# Patient Record
Sex: FEMALE | Race: WHITE | NOT HISPANIC OR LATINO | Employment: FULL TIME | URBAN - METROPOLITAN AREA
[De-identification: names, ages, dates, MRNs, and addresses within clinical notes are randomized per-mention and may not be internally consistent; named-entity substitution may affect disease eponyms.]

---

## 2017-02-13 ENCOUNTER — ALLSCRIPTS OFFICE VISIT (OUTPATIENT)
Dept: OTHER | Facility: OTHER | Age: 52
End: 2017-02-13

## 2017-02-13 DIAGNOSIS — E78.00 PURE HYPERCHOLESTEROLEMIA: ICD-10-CM

## 2017-04-20 ENCOUNTER — ALLSCRIPTS OFFICE VISIT (OUTPATIENT)
Dept: OTHER | Facility: OTHER | Age: 52
End: 2017-04-20

## 2017-04-20 DIAGNOSIS — Z12.31 ENCOUNTER FOR SCREENING MAMMOGRAM FOR MALIGNANT NEOPLASM OF BREAST: ICD-10-CM

## 2017-04-20 DIAGNOSIS — IMO0002 UNSPECIFIED DYSPAREUNIA: ICD-10-CM

## 2017-05-08 ENCOUNTER — GENERIC CONVERSION - ENCOUNTER (OUTPATIENT)
Dept: OTHER | Facility: OTHER | Age: 52
End: 2017-05-08

## 2017-10-25 ENCOUNTER — GENERIC CONVERSION - ENCOUNTER (OUTPATIENT)
Dept: OTHER | Facility: OTHER | Age: 52
End: 2017-10-25

## 2017-12-11 ENCOUNTER — ALLSCRIPTS OFFICE VISIT (OUTPATIENT)
Dept: OTHER | Facility: OTHER | Age: 52
End: 2017-12-11

## 2017-12-13 NOTE — PROGRESS NOTES
Assessment    1  Acid reflux disease (530 81) (K21 9)   2  Acute otitis media (382 9) (H66 90)    Plan  Acid reflux disease    · Lansoprazole 30 MG Oral Capsule Delayed Release; Take 1 capsule twice daily  Acute otitis media    · Cefuroxime Axetil 500 MG Oral Tablet; TAKE 1 TABLET EVERY 12 HOURS DAILY    Discussion/Summary    #1 Acid reflex diseaseprescription for Lansoprazole and sent to designated pharmacyAcute otitis mediawith patient plan to treat with an antibiotic - sent to designated pharmacyinstructed to call of not feeling better in 72 hoursPatient encouraged to call for problems or concerns  Chief Complaint    1  Cold Symptoms    History of Present Illness  HPI: 47year old female presenting with left ear pain for 3 days but reports having a low non-productive cough for over one week  The cough as resolved but ear pain continuing  Took two doses of Cefuroxime that she had a t home one dose on Friday evening and another on Sunday morning  denies having fever, chills or body aches with other symptoms  Cold Symptoms: PAUL PEDRO presents with complaints of cold symptoms  Associated symptoms include plugged ear(s)-- and-- ear pain, but-- no sneezing,-- no nasal congestion,-- no runny nose,-- no post nasal drainage,-- no scratchy throat,-- no sore throat,-- no hoarseness,-- no dry cough,-- no productive cough,-- no facial pressure,-- no facial pain,-- no headache,-- no swollen lymph nodes,-- no wheezing,-- no shortness of breath,-- no fatigue,-- no weakness,-- no nausea,-- no vomiting,-- no diarrhea,-- no fever-- and-- no chills  Review of Systems   Constitutional: No fever, no chills, feels well, no tiredness, no recent weight gain or loss  Active Problems    1  Acid reflux disease (530 81) (K21 9)   2  Acute bronchitis (466 0) (J20 9)   3  Acute gastritis (535 00) (K29 00)   4  Acute pharyngitis (462) (J02 9)   5  Acute sinusitis (461 9) (J01 90)   6  Acute URI (465 9) (J06 9)   7   Airway Aspiration (934 9)   8  Allergic rhinitis (477 9) (J30 9)   9  Anxiety (300 00) (F41 9)   10  Asthma (493 90) (J45 909)   11  Bacterial vaginosis (616 10,041 9) (N76 0,B96 89)   12  Bullous myringitis of right ear (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 01) (H73 011)   13  Bursitis, knee, right (726 60) (M70 51)   14  Cataract (366 9) (H26 9)   15  Cough (786 2) (R05)   16  Cough variant asthma (493 82) (J45 991)   17  Cyst   18  Degeneration of posterior vitreous body of left eye (379 21) (H43 812)   19  Depression (311) (F32 9)   20  Dysfunction of right eustachian tube (381 81) (H69 81)   21  Dyspareunia, female (625 0) (N94 10)   22  Dysuria (788 1) (R30 0)   23  Encounter for routine gynecological examination (V72 31) (Z01 419)   24  Encounter for screening colonoscopy (V76 51) (Z12 11)   25  Encounter for screening mammogram for malignant neoplasm of breast (V76 12)  (Z12 31)   26  Fatigue (780 79) (R53 83)   27  Hypercholesteremia (272 0) (E78 00)   28  Hypothyroidism (244 9) (E03 9)   29  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)   30  Left retinal detachment (361 9) (H33 22)   31  Nasal congestion (478 19) (R09 81)   32  Need for prophylactic vaccination and inoculation against influenza (V04 81) (Z23)   33  Pain of upper extremity, unspecified laterality (729 5) (M79 603)   34  Pain, upper back (724 5) (M54 9)   35  Pap smear abnormality of cervix with ASCUS favoring benign (795 01) (R87 610)   36  Persistent headaches (784 0) (R51)   37  Retinal detachment with multiple breaks, left (361 02) (H33 022)   38  Right acute otitis media (382 9) (H66 91)   39  Screening for HPV (human papillomavirus) (V73 81) (Z11 51)   40  Thyroid disorder (246 9) (E07 9)   41  Tietzes syndrome (733 6) (M94 0)   42  Urticaria (708 9) (L50 9)   43  UTI (urinary tract infection) (599 0) (N39 0)   44  Uvular swelling (528 9) (K13 79)   45  Vaginal atrophy (627 3) (N95 2)   46  Viral stomatitis (528 00,079 99) (K12 1,B97 89)   47   Visit for screening mammogram (V76 12) (Z12 31)    Past Medical History    1  History of ASCUS favor benign (796 9)   2  History of Cervical cancer screening (V76 2) (Z12 4)   3  History of vaginal discharge (V13 29) (Z87 42)   4  Need for prophylactic vaccination and inoculation against influenza (V04 81) (Z23)   5  History of Vaginal odor (625 8) (N89 8)   6  History of Visit for screening mammogram (I15 39) (Z12 31)  Active Problems And Past Medical History Reviewed: The active problems and past medical history were reviewed and updated today  Family History  Mother    1  No significant family history  Father    2  Family history of Essential Hypertension   3  Family history of hypertension (V17 49) (Z82 49)  Maternal Grandmother    4  Family history of Essential Hypertension  Paternal Grandmother    11  Family history of Diabetes Mellitus (V18 0)   6  Family history of Essential Hypertension   7  Family history of Stroke Syndrome (V17 1)  Maternal Grandfather    8  Family history of Essential Hypertension  Paternal Grandfather    5  Family history of Essential Hypertension   10  Family history of Stroke Syndrome (V17 1)  Family History    11  Family history of Colon cancer   12  Denied: Family history of Crohn's disease without complication, unspecified  gastrointestinal tract location   15  Denied: Family history of liver disease  Family History Reviewed: The family history was reviewed and updated today  Social History   · Denied: History of Alcohol Use (History)   · Daily Cola Consumption (___ Cans/Day)   · Daily Tea Consumption (___ Cups/Day)   · Marital History - Currently    · Never A Smoker  The social history was reviewed and updated today  Surgical History    1  History of  Section   2  History of Cholecystectomy  Surgical History Reviewed: The surgical history was reviewed and updated today  Current Meds   1   Azelastine HCl - 0 1 % Nasal Solution; INSTILL 2 SPRAYS IN EACH NOSTRIL ONCE DAILY; Therapy: 74PHR0496 to (Evaluate:27Jan2017)  Requested for: 48Zgj7327; Last Rx:77Ayy9847 Ordered   2  Cleocin 100 MG Vaginal Suppository; INSERT 1 SUPPOSITORY INTRAVAGINALLY AT BEDTIME NIGHTLY; Therapy: 59Xfg9581 to (Evaluate:19Uia2917)  Requested for: 29Mop4532; Last Rx:52Rmh5648 Ordered   3  Escitalopram Oxalate 10 MG Oral Tablet; TAKE ONE AND ONE-HALF TABLETS DAILY; Therapy: 92OFP1127 to (Last [id number] [id number])  Requested for: 28Nov2017 Ordered   4  Estrace 0 1 MG/GM Vaginal Cream; Place 1/2 applicator full (2gm) in vagina every night for 2 weeks, then pea size amount at night 2-3 nights a week; Therapy: 20Apr2017 to (Last Rx:20Apr2017)  Requested for: 92Hmi8698 Ordered   5  Lansoprazole 30 MG Oral Capsule Delayed Release; Take 1 capsule twice daily; Therapy: (Recorded:54Yxm7496) to Recorded   6  Levothyroxine Sodium 112 MCG Oral Tablet; Take 1 tablet daily; Therapy: 92UIR1849 to (Last Rx:72Nxl8767)  Requested for: 99Suz8453 Ordered   7  Symbicort 160-4 5 MCG/ACT Inhalation Aerosol; Therapy: (Recorded:73Iib6199) to Recorded   8  Triamcinolone Acetonide 55 MCG/ACT Nasal Aerosol; INSTILL 2 SQUIRT Daily; Therapy: 30XNB7234 to (Evaluate:11Sjb9174)  Requested for: 94GOT6856; Last Rx:64Pep5105 Ordered   9  Ventolin  (90 Base) MCG/ACT Inhalation Aerosol Solution; INHALE 2 PUFFS 4 TIMES DAILY AS NEEDED; Therapy: 05NUV1410 to ([phone])  Requested for: 29GIJ6032; Last Rx:74Xha8088 Ordered    The medication list was reviewed and updated today  Allergies  1  Aspirin CHEW   2  Codeine Derivatives    Vitals   Recorded: 11Dec2017 10:33AM   Temperature 99 1 F   Heart Rate 64   Systolic 022   Diastolic 70   Height 5 ft 4 in   Weight 207 lb    BMI Calculated 35 53   BSA Calculated 1 98       Physical Exam   Constitutional  General appearance: No acute distress, well appearing and well nourished  Eyes  Conjunctiva and lids: No swelling, erythema or discharge     Pupils and irises: Equal, round and reactive to light  Ears, Nose, Mouth, and Throat  External inspection of ears and nose: Normal    Otoscopic examination: Abnormal  -- Bilateral TM dullness with erythema noted on left  Nasal mucosa, septum, and turbinates: Normal without edema or erythema  Oropharynx: Abnormal  -- positive post nasal injection and posterior pharynx erythema  Pulmonary  Respiratory effort: No increased work of breathing or signs of respiratory distress  Auscultation of lungs: Clear to auscultation  Cardiovascular  Auscultation of heart: Normal rate and rhythm, normal S1 and S2, without murmurs  Examination of extremities for edema and/or varicosities: Normal    Abdomen  Abdomen: Non-tender, no masses  Lymphatic  Palpation of lymph nodes in neck: Abnormal  -- positive anterior cervical lymphadenopathy  Musculoskeletal  Digits and nails: Normal without clubbing or cyanosis  Skin  Skin and subcutaneous tissue: Normal without rashes or lesions  Neurologic  Reflexes: 2+ and symmetric     Psychiatric  Orientation to person, place, and time: Normal    Mood and affect: Normal          Signatures   Electronically signed by : Bernadette Canales; Dec 11 2017 12:14PM EST                       (Co-author)    Electronically signed by : Faustino Elias DO; Dec 12 2017  5:34AM EST                       (Author)

## 2017-12-27 ENCOUNTER — GENERIC CONVERSION - ENCOUNTER (OUTPATIENT)
Dept: OTHER | Facility: OTHER | Age: 52
End: 2017-12-27

## 2018-01-12 VITALS
BODY MASS INDEX: 35.02 KG/M2 | DIASTOLIC BLOOD PRESSURE: 70 MMHG | HEIGHT: 64 IN | HEART RATE: 73 BPM | WEIGHT: 205.13 LBS | SYSTOLIC BLOOD PRESSURE: 118 MMHG

## 2018-01-14 VITALS
SYSTOLIC BLOOD PRESSURE: 108 MMHG | DIASTOLIC BLOOD PRESSURE: 80 MMHG | BODY MASS INDEX: 35.85 KG/M2 | TEMPERATURE: 97.4 F | WEIGHT: 210 LBS | HEART RATE: 76 BPM | HEIGHT: 64 IN

## 2018-01-18 NOTE — RESULT NOTES
Message    Gastric biopsies are benign  1    Colon polyp removed came back as benign  Next colonoscopy in 10   years  pt aware of results/reminder set  thanks CF1       1 Amended By: Sheila Moore; May 06 2016 2:51 PM EST    Verified Results  (1) TISSUE EXAM 25Apr2016 09:30AM Devon Garibay     Test Name Result Flag Reference   LAB AP CASE REPORT (Report)     Surgical Pathology Report             Case: [id number]                   Authorizing Provider: Arian Fairchild MD     Collected:      04/25/2016 0930        Ordering Location:   [facility]    Received:      04/25/2016 [address] [place] [address] Endoscopy                               Pathologist:      Colletta Briar, MD                                 Specimens:  A) - Stomach, Cold Bx Gaastric Body R/O H Pylori                            B) - Duodenum, Cold Bx duodenum R/O celiac                               C) - Colon, Cold snare polypectomy sigmoid colon   LAB AP FINAL DIAGNOSIS (Report)     A  Gastric body (biopsy):    - Chronic inactive gastritis involving foveolar mucosa  - Immunostain for H  pylori (with appropriate positive control) is   negative  - No intestinal metaplasia, dysplasia or neoplasia identified  B  Duodenum (biopsy):    - Small bowel mucosa with mild chronic non-specific duodenitis  - No villous atrophy, increased intraepithelial lymphocytes or crypt   hyperplasia to suggest     malabsorptive enteropathy     - No active inflammation, granulomas, organisms, dysplasia or neoplasia   identified  C  Sigmoid colon polyp (polypectomy):    - Polypoid colonic mucosa, favor hyperplastic polyp      - No high grade dysplasia or malignancy identified  LAB AP NOTE      Interpretation performed at [facility], [address] [place] [address]   [id number]     LAB AP SURGICAL ADDITIONAL INFORMATION (Report)     These tests were developed and their performance characteristics   determined by [facility] or [facility]   [facility]atories  They may not be cleared or approved by the [place]  Food and   Drug Administration  The FDA has determined that such clearance or   approval is not necessary  These tests are used for clinical purposes  They should not be regarded as investigational or for research  This   laboratory has been approved by [place] 88, designated as a high-complexity   laboratory and is qualified to perform these tests  LAB AP GROSS DESCRIPTION (Report)     A  The specimen is received in formalin, labeled with the patient's name   and hospital number, and is designated gastric body rule out H  pylori  The specimen consists of a single tan soft tissue fragment measuring 0 5   cm  Entirely submitted  One cassette  B  The specimen is received in formalin, labeled with the patient's name   and hospital number, and is designated biopsy duodenum rule out celiac  The specimen consists of 4 tan soft tissue fragments each measuring   0 3-0 4 cm  Entirely submitted  One cassette  Note: The estimated total formalin fixation time based upon information   provided by the submitting clinician and the standard processing schedule   is 19 0 hours  C  The specimen is received in formalin, labeled with the patient's name   and hospital number, and is designated polypectomy sigmoid colon  The   specimen consists of a single tan soft tissue fragment measuring 0 5-0 6   cm  Entirely submitted  One cassette  Note: The estimated total formalin fixation time based upon information   provided by the submitting clinician and the standard processing schedule   is 18 75 hours  MAC   LAB AP CLINICAL INFORMATION      Cough  ï¾   Colon cancer screening  ï¾   Gastroesophageal reflux disease, esophagitis presence not specified

## 2018-01-23 VITALS
WEIGHT: 207 LBS | BODY MASS INDEX: 35.34 KG/M2 | DIASTOLIC BLOOD PRESSURE: 70 MMHG | TEMPERATURE: 99.1 F | SYSTOLIC BLOOD PRESSURE: 108 MMHG | HEART RATE: 64 BPM | HEIGHT: 64 IN

## 2018-01-24 VITALS
BODY MASS INDEX: 35.68 KG/M2 | HEART RATE: 62 BPM | SYSTOLIC BLOOD PRESSURE: 98 MMHG | DIASTOLIC BLOOD PRESSURE: 62 MMHG | TEMPERATURE: 98.9 F | HEIGHT: 64 IN | WEIGHT: 209 LBS

## 2018-01-30 RX ORDER — LANSOPRAZOLE 30 MG/1
1 CAPSULE, DELAYED RELEASE ORAL 2 TIMES DAILY
COMMUNITY
End: 2018-05-10 | Stop reason: ALTCHOICE

## 2018-01-31 RX ORDER — LANSOPRAZOLE 30 MG/1
30 CAPSULE, DELAYED RELEASE ORAL DAILY
Refills: 0 | Status: CANCELLED | OUTPATIENT
Start: 2018-01-31

## 2018-02-02 ENCOUNTER — TELEPHONE (OUTPATIENT)
Dept: FAMILY MEDICINE CLINIC | Facility: CLINIC | Age: 53
End: 2018-02-02

## 2018-02-02 NOTE — TELEPHONE ENCOUNTER
----- Message from Soha Garcia DO sent at 2/1/2018  2:04 PM EST -----  Regarding: FW: Prior auth   Tell her to try one daily and if she has breakthrough symptoms she will need to see gI to try to get this doseage approved, because we couldn't   ----- Message -----  From: Magdalena Kee  Sent: 2/1/2018   1:49 PM  To: Soha Garcia DO  Subject: Prior auth                                       Prior auth for Lansoprazole 30mg 1 bid was denied by the patient's rx plan  Do you want change the rx to 1qd?  Please advise

## 2018-02-02 NOTE — TELEPHONE ENCOUNTER
PT AWARE AND STATES SHE WILL BE CALLING HER INSURANCE BECAUSE SHE IS NOT HAPPY WITH THE DENIAL DETERMINATION   PT WILL CALL  BACK IF WANTS TO REQUEST LANSOPRAZOLE 1QD RX

## 2018-02-26 ENCOUNTER — TRANSCRIBE ORDERS (OUTPATIENT)
Dept: ADMINISTRATIVE | Facility: HOSPITAL | Age: 53
End: 2018-02-26

## 2018-02-26 DIAGNOSIS — K21.9 GASTROESOPHAGEAL REFLUX DISEASE, ESOPHAGITIS PRESENCE NOT SPECIFIED: Primary | ICD-10-CM

## 2018-04-25 DIAGNOSIS — R53.83 OTHER FATIGUE: ICD-10-CM

## 2018-04-25 DIAGNOSIS — E03.9 HYPOTHYROIDISM, UNSPECIFIED TYPE: Primary | ICD-10-CM

## 2018-04-25 DIAGNOSIS — E78.00 HYPERCHOLESTEROLEMIA: ICD-10-CM

## 2018-05-09 LAB
ALBUMIN SERPL-MCNC: 4.2 G/DL (ref 3.6–5.1)
ALBUMIN/GLOB SERPL: 1.3 (CALC) (ref 1–2.5)
ALP SERPL-CCNC: 81 U/L (ref 33–130)
ALT SERPL-CCNC: 20 U/L (ref 6–29)
AST SERPL-CCNC: 18 U/L (ref 10–35)
BILIRUB SERPL-MCNC: 0.4 MG/DL (ref 0.2–1.2)
BUN SERPL-MCNC: 14 MG/DL (ref 7–25)
BUN/CREAT SERPL: ABNORMAL (CALC) (ref 6–22)
CALCIUM SERPL-MCNC: 9.6 MG/DL (ref 8.6–10.4)
CHLORIDE SERPL-SCNC: 103 MMOL/L (ref 98–110)
CHOLEST SERPL-MCNC: 219 MG/DL
CHOLEST/HDLC SERPL: 4.9 (CALC)
CO2 SERPL-SCNC: 30 MMOL/L (ref 20–31)
CREAT SERPL-MCNC: 0.9 MG/DL (ref 0.5–1.05)
GLOBULIN SER CALC-MCNC: 3.2 G/DL (CALC) (ref 1.9–3.7)
GLUCOSE SERPL-MCNC: 100 MG/DL (ref 65–99)
HDLC SERPL-MCNC: 45 MG/DL
LDLC SERPL CALC-MCNC: 138 MG/DL (CALC)
NONHDLC SERPL-MCNC: 174 MG/DL (CALC)
POTASSIUM SERPL-SCNC: 4.1 MMOL/L (ref 3.5–5.3)
PROT SERPL-MCNC: 7.4 G/DL (ref 6.1–8.1)
SL AMB EGFR AFRICAN AMERICAN: 85 ML/MIN/1.73M2
SL AMB EGFR NON AFRICAN AMERICAN: 74 ML/MIN/1.73M2
SODIUM SERPL-SCNC: 139 MMOL/L (ref 135–146)
TRIGL SERPL-MCNC: 219 MG/DL
TSH SERPL-ACNC: 3.63 MIU/L

## 2018-05-10 ENCOUNTER — OFFICE VISIT (OUTPATIENT)
Dept: FAMILY MEDICINE CLINIC | Facility: CLINIC | Age: 53
End: 2018-05-10
Payer: COMMERCIAL

## 2018-05-10 VITALS
DIASTOLIC BLOOD PRESSURE: 74 MMHG | HEART RATE: 72 BPM | WEIGHT: 207.6 LBS | HEIGHT: 64 IN | SYSTOLIC BLOOD PRESSURE: 122 MMHG | RESPIRATION RATE: 16 BRPM | BODY MASS INDEX: 35.44 KG/M2 | TEMPERATURE: 98.6 F

## 2018-05-10 DIAGNOSIS — R63.8 INCREASED BMI: Primary | ICD-10-CM

## 2018-05-10 DIAGNOSIS — R05.9 COUGH: ICD-10-CM

## 2018-05-10 DIAGNOSIS — E03.9 HYPOTHYROIDISM, UNSPECIFIED TYPE: ICD-10-CM

## 2018-05-10 DIAGNOSIS — Z12.4 ENCOUNTER FOR SCREENING FOR CERVICAL CANCER: ICD-10-CM

## 2018-05-10 DIAGNOSIS — L72.0 INCLUSION CYST: ICD-10-CM

## 2018-05-10 DIAGNOSIS — K21.00 GASTROESOPHAGEAL REFLUX DISEASE WITH ESOPHAGITIS: ICD-10-CM

## 2018-05-10 PROCEDURE — 99214 OFFICE O/P EST MOD 30 MIN: CPT | Performed by: FAMILY MEDICINE

## 2018-05-10 PROCEDURE — 3008F BODY MASS INDEX DOCD: CPT | Performed by: FAMILY MEDICINE

## 2018-05-10 RX ORDER — ALBUTEROL SULFATE 90 UG/1
2 AEROSOL, METERED RESPIRATORY (INHALATION) EVERY 6 HOURS PRN
Qty: 8 G | Refills: 3 | Status: SHIPPED | OUTPATIENT
Start: 2018-05-10 | End: 2021-04-30 | Stop reason: SDUPTHER

## 2018-05-10 RX ORDER — PANTOPRAZOLE SODIUM 40 MG/1
40 TABLET, DELAYED RELEASE ORAL AS NEEDED
COMMUNITY
Start: 2018-05-07 | End: 2020-09-11 | Stop reason: ALTCHOICE

## 2018-05-10 NOTE — PROGRESS NOTES
Patient ID: Karis Church is a 47 y o  female  HPI: 47 y  o female presenting with continued cough for which she is being treated for [facility] by ENT and going to see an allergist as well  She would like to lose weight and is in interested in seeing a medical weight loss specialist   She also has an inclusion cyst on her back wich is getting larger and would like to have removed  Her tsh is therapeuric  SUBJECTIVE    Family History   Problem Relation Age of Onset    Hypertension Father      essential    Hypertension Maternal Grandmother      essential    Hypertension Maternal Grandfather      essential    Diabetes Paternal Grandmother      mellitus    Hypertension Paternal Grandmother      essential    Stroke Paternal Grandmother      syndrome    Colon cancer Family      Social History     Social History    Marital status: /Civil Union     Spouse name: N/A    Number of children: N/A    Years of education: N/A     Occupational History    Not on file  Social History Main Topics    Smoking status: Never Smoker    Smokeless tobacco: Not on file    Alcohol use No    Drug use: No    Sexual activity: Not on file     Other Topics Concern    Not on file     Social History Narrative    Daily cola/tea consumption         Past Medical History:   Diagnosis Date    Asthma     Chronic cough     Depression     Disease of thyroid gland     hypo    Esophageal reflux     Fatigue     High cholesterol      Past Surgical History:   Procedure Laterality Date     SECTION      CHOLECYSTECTOMY      MT COLONOSCOPY FLX DX W/COLLJ SPEC WHEN PFRMD N/A 2016    Procedure: EGD AND COLONOSCOPY;  Surgeon: Anastasia Randhawa MD;  [facility];   Service: Gastroenterology     Allergies   Allergen Reactions    Aspirin     Codeine        Current Outpatient Prescriptions:     albuterol (PROVENTIL HFA) 90 mcg/act inhaler, Inhale 2 puffs every 6 (six) hours as needed for wheezing , Disp: , Rfl:    azelastine (ASTELIN) 0 1 % nasal spray, 1 spray into each nostril 2 (two) times a day  Use in each nostril as directed, Disp: , Rfl:     escitalopram (LEXAPRO) 10 mg tablet, Take 10 mg by mouth daily  , Disp: , Rfl:     Levothyroxine Sodium 112 MCG CAPS, Take 112 mcg by mouth daily  , Disp: , Rfl:     pantoprazole (PROTONIX) 40 mg tablet, , Disp: , Rfl:     albuterol (PROVENTIL HFA,VENTOLIN HFA) 90 mcg/act inhaler, Inhale 2 puffs every 6 (six) hours as needed for wheezing, Disp: 8 g, Rfl: 3    Review of Systems  Constitutional:     Denies fever, chills ,fatigue ,weakness ,weight loss, +weight gain     ENT: Denies earache ,loss of hearing ,nosebleed, nasal discharge,nasal congestion ,sore throat ,hoarseness  Pulmonary: Denies shortness of breath ,+cough  ,but no dyspnea on exertion, orthopnea  ,PND   Cardiovascular:  Denies bradycardia , tachycardia  ,palpations, lower extremity edema leg, claudication  Breast:  Denies new or changing breast lumps ,nipple discharge ,nipple changes  Abdomen:  Denies abdominal pain , anorexia , indigestion, nausea, vomiting, constipation, diarrhea  Musculoskeletal: Denies myalgias, arthralgias, joint swelling, joint stiffness , limb pain, limb swelling  Gu: denies dysuria, polyuria  Skin: Denies skin rash, skin lesion, skin wound, itching, dry skin + inclusion cyst on back getting larger  Neuro: Denies headache, numbness, tingling, confusion, loss of consciousness, dizziness, vertigo  Psychiatric: Denies feelings of depression, suicidal ideation, anxiety, sleep disturbances    OBJECTIVE    Constitutional:   NAD, well appearing and well nourished      ENT:   Conjunctiva and lids: no injection, edema, or discharge     Pupils and iris: SANTI bilaterally    External inspection of ears and nose: normal without deformities or discharge  Otoscopic exam: Canals patent without erythema         Nasal mucosa, septum and turbinates: Normal or edema or discharge         Oropharynx:  Moist mucosa, normal tongue and tonsils without lesions  No erythema    _ pnd  Pulmonary:Respiratory effort normal rate and rhythm, no increased work of breathing  Auscultation of lungs:  Clear bilaterally with no adventitious breath sounds       Cardiovascular: regular rate and rhythm, S1 and S2, no murmur, no edema and/or varicosities of LE      Abdomen: Soft and non-distended     Positive bowel sounds      No heptomegaly or splenomegaly      Gu: no suprapubic tenderness or CVA tenderness, no urethral discharge  Lymphatic:  No anterior or posterior cervical lymphadenopathy         Musculoskeletal:  Gait and station: Normal gait      Digits and nails normal without clubbing or cyanosis     Inspection/palpation of joints, bones, and muscles:  No joint tenderness, swelling, full active and passive range of motion       Skin: Normal skin turgor and no rashes      Neuro:      Normal reflexes    Psych:   alert and oriented to person, place and time     normal mood and affect       Assessment/Plan:Diagnoses and all orders for this visit:    Increased BMI  -     Ambulatory referral to Weight Management; Future    Inclusion cyst  -     Ambulatory referral to General Surgery; Future    Cough  -     albuterol (PROVENTIL HFA,VENTOLIN HFA) 90 mcg/act inhaler; Inhale 2 puffs every 6 (six) hours as needed for wheezing    Gastroesophageal reflux disease with esophagitis    Hypothyroidism, unspecified type    Encounter for screening for cervical cancer   -     Ambulatory referral to Gynecology; Future    Other orders  -     pantoprazole (PROTONIX) 40 mg tablet; Will aait referrals to specialty groups and follow up with pt in 6 mos time

## 2018-05-25 DIAGNOSIS — J32.9 SINUSITIS, UNSPECIFIED CHRONICITY, UNSPECIFIED LOCATION: Primary | ICD-10-CM

## 2018-05-25 RX ORDER — CEFUROXIME AXETIL 500 MG/1
TABLET ORAL
Qty: 20 TABLET | Refills: 0 | Status: SHIPPED | OUTPATIENT
Start: 2018-05-25 | End: 2019-02-05 | Stop reason: SDUPTHER

## 2018-06-22 DIAGNOSIS — E03.9 HYPOTHYROIDISM, UNSPECIFIED TYPE: Primary | ICD-10-CM

## 2018-06-22 RX ORDER — LEVOTHYROXINE SODIUM 112 UG/1
112 TABLET ORAL DAILY
Qty: 90 TABLET | Refills: 1 | Status: SHIPPED | OUTPATIENT
Start: 2018-06-22 | End: 2018-12-21 | Stop reason: SDUPTHER

## 2018-09-17 PROBLEM — E66.9 OBESITY, CLASS II, BMI 35-39.9: Status: ACTIVE | Noted: 2018-09-17

## 2018-09-17 PROBLEM — E66.812 OBESITY, CLASS II, BMI 35-39.9: Status: ACTIVE | Noted: 2018-09-17

## 2018-09-20 ENCOUNTER — HOSPITAL ENCOUNTER (OUTPATIENT)
Dept: GASTROENTEROLOGY | Facility: HOSPITAL | Age: 53
Discharge: HOME | End: 2018-09-20
Attending: OTOLARYNGOLOGY
Payer: COMMERCIAL

## 2018-09-20 VITALS — HEIGHT: 64 IN

## 2018-09-20 DIAGNOSIS — K21.9 GASTROESOPHAGEAL REFLUX DISEASE, ESOPHAGITIS PRESENCE NOT SPECIFIED: ICD-10-CM

## 2018-09-20 PROCEDURE — 91020 GASTRIC MOTILITY STUDIES: CPT

## 2018-09-20 NOTE — PERIOPERATIVE NURSING NOTE
Manometry catheter passed through left side  Pt tolerated 10 liquid and 10 viscous swallows  Catheter removed without difficulty  Pt refused to have [facility] probe inserted  Pt left in NAD

## 2018-11-14 PROCEDURE — 91010 ESOPHAGUS MOTILITY STUDY: CPT | Performed by: INTERNAL MEDICINE

## 2018-12-21 DIAGNOSIS — E03.9 HYPOTHYROIDISM, UNSPECIFIED TYPE: ICD-10-CM

## 2018-12-21 RX ORDER — LEVOTHYROXINE SODIUM 112 UG/1
112 TABLET ORAL DAILY
Qty: 90 TABLET | Refills: 1 | Status: SHIPPED | OUTPATIENT
Start: 2018-12-21 | End: 2019-07-02 | Stop reason: SDUPTHER

## 2019-02-05 DIAGNOSIS — J32.9 SINUSITIS, UNSPECIFIED CHRONICITY, UNSPECIFIED LOCATION: ICD-10-CM

## 2019-02-05 RX ORDER — CEFUROXIME AXETIL 500 MG/1
TABLET ORAL
Qty: 20 TABLET | Refills: 0 | Status: SHIPPED | OUTPATIENT
Start: 2019-02-05 | End: 2019-02-15

## 2019-05-06 ENCOUNTER — OFFICE VISIT (OUTPATIENT)
Dept: FAMILY MEDICINE CLINIC | Facility: CLINIC | Age: 54
End: 2019-05-06
Payer: COMMERCIAL

## 2019-05-06 VITALS
HEART RATE: 70 BPM | DIASTOLIC BLOOD PRESSURE: 78 MMHG | SYSTOLIC BLOOD PRESSURE: 118 MMHG | BODY MASS INDEX: 35.68 KG/M2 | HEIGHT: 64 IN | WEIGHT: 209 LBS | TEMPERATURE: 98.8 F

## 2019-05-06 DIAGNOSIS — K21.00 GASTROESOPHAGEAL REFLUX DISEASE WITH ESOPHAGITIS: ICD-10-CM

## 2019-05-06 DIAGNOSIS — L72.0 INFECTED INCLUSION CYST: Primary | ICD-10-CM

## 2019-05-06 DIAGNOSIS — L08.9 INFECTED INCLUSION CYST: Primary | ICD-10-CM

## 2019-05-06 PROCEDURE — 99214 OFFICE O/P EST MOD 30 MIN: CPT | Performed by: FAMILY MEDICINE

## 2019-05-06 PROCEDURE — 3008F BODY MASS INDEX DOCD: CPT | Performed by: FAMILY MEDICINE

## 2019-05-06 RX ORDER — CEFUROXIME AXETIL 500 MG/1
500 TABLET ORAL EVERY 12 HOURS SCHEDULED
Qty: 20 TABLET | Refills: 0 | Status: SHIPPED | OUTPATIENT
Start: 2019-05-06 | End: 2019-05-16

## 2019-05-14 ENCOUNTER — TELEPHONE (OUTPATIENT)
Dept: ADMINISTRATIVE | Facility: OTHER | Age: 54
End: 2019-05-14

## 2019-05-27 PROBLEM — M79.9 SOFT TISSUE LESION: Status: ACTIVE | Noted: 2019-05-27

## 2019-07-02 DIAGNOSIS — E03.9 HYPOTHYROIDISM, UNSPECIFIED TYPE: ICD-10-CM

## 2019-07-02 RX ORDER — LEVOTHYROXINE SODIUM 112 UG/1
112 TABLET ORAL DAILY
Qty: 90 TABLET | Refills: 1 | Status: SHIPPED | OUTPATIENT
Start: 2019-07-02 | End: 2020-07-06

## 2019-07-24 ENCOUNTER — HOSPITAL ENCOUNTER (EMERGENCY)
Facility: HOSPITAL | Age: 54
Discharge: HOME | End: 2019-07-24
Attending: EMERGENCY MEDICINE | Admitting: EMERGENCY MEDICINE
Payer: COMMERCIAL

## 2019-07-24 ENCOUNTER — APPOINTMENT (EMERGENCY)
Dept: RADIOLOGY | Facility: HOSPITAL | Age: 54
End: 2019-07-24
Payer: COMMERCIAL

## 2019-07-24 VITALS
SYSTOLIC BLOOD PRESSURE: 110 MMHG | RESPIRATION RATE: 20 BRPM | HEART RATE: 79 BPM | HEIGHT: 64 IN | OXYGEN SATURATION: 93 % | TEMPERATURE: 98.9 F | DIASTOLIC BLOOD PRESSURE: 52 MMHG | BODY MASS INDEX: 34.83 KG/M2 | WEIGHT: 204 LBS
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
## 2019-07-24 PROCEDURE — 99284 EMERGENCY DEPT VISIT MOD MDM: CPT

## 2019-07-24 PROCEDURE — 71046 X-RAY EXAM CHEST 2 VIEWS: CPT

## 2019-07-24 PROCEDURE — 99283 EMERGENCY DEPT VISIT LOW MDM: CPT | Performed by: EMERGENCY MEDICINE
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
## 2019-10-23 ENCOUNTER — ANNUAL EXAM (OUTPATIENT)
Dept: OBGYN CLINIC | Facility: CLINIC | Age: 54
End: 2019-10-23
Payer: COMMERCIAL

## 2019-10-23 VITALS
DIASTOLIC BLOOD PRESSURE: 82 MMHG | BODY MASS INDEX: 35.51 KG/M2 | HEIGHT: 64 IN | WEIGHT: 208 LBS | SYSTOLIC BLOOD PRESSURE: 128 MMHG

## 2019-10-23 DIAGNOSIS — Z12.4 ENCOUNTER FOR SCREENING FOR MALIGNANT NEOPLASM OF CERVIX: ICD-10-CM

## 2019-10-23 DIAGNOSIS — N95.2 POSTMENOPAUSAL ATROPHIC VAGINITIS: ICD-10-CM

## 2019-10-23 DIAGNOSIS — Z01.419 WELL FEMALE EXAM WITH ROUTINE GYNECOLOGICAL EXAM: Primary | ICD-10-CM

## 2019-10-23 DIAGNOSIS — Z12.31 ENCOUNTER FOR SCREENING MAMMOGRAM FOR MALIGNANT NEOPLASM OF BREAST: ICD-10-CM

## 2019-10-23 PROCEDURE — 99396 PREV VISIT EST AGE 40-64: CPT | Performed by: OBSTETRICS & GYNECOLOGY

## 2019-10-23 PROCEDURE — G0145 SCR C/V CYTO,THINLAYER,RESCR: HCPCS | Performed by: OBSTETRICS & GYNECOLOGY

## 2019-10-23 PROCEDURE — 87624 HPV HI-RISK TYP POOLED RSLT: CPT | Performed by: OBSTETRICS & GYNECOLOGY

## 2019-10-23 RX ORDER — ESTRADIOL 0.1 MG/G
1 CREAM VAGINAL 3 TIMES WEEKLY
Qty: 42.5 G | Refills: 10 | Status: SHIPPED | OUTPATIENT
Start: 2019-10-23 | End: 2020-10-26 | Stop reason: SDUPTHER

## 2019-10-23 NOTE — PROGRESS NOTES
ASSESSMENT & PLAN:   Diagnoses and all orders for this visit:    Well female exam with routine gynecological exam  Encounter for screening for malignant neoplasm of cervix  -     Liquid-based pap, screening      Encounter for screening mammogram for malignant neoplasm of breast  -     Mammo screening bilateral w cad; Future    Postmenopausal atrophic vaginitis  -     estradiol (ESTRACE) 0 1 mg/g vaginal cream; Insert 1 g into the vagina 3 (three) times a week        The following were reviewed in today's visit: Current ASCCP Guidelines, breast self exam, mammography screening ordered, menopause, exercise and healthy diet  Patient to return to office yearly for annual exam      All questions have been answered to her satisfaction  CC:  Annual Gynecologic Examination    HPI: Arron Navarro is a 49 y o  [id number] who presents for annual gynecologic examination  She has the following concerns:  None  Health Maintenance:    She exercises 0 days per week  She wears her seatbelt routinely  She does not perform regular monthly self breast exams  She feels safe at home  Patient tries to follow a balanced diet  Last mammogram: 2017, going today for mammogram    Last colonoscopy:        Past Medical History:   Diagnosis Date    Acute gastritis     Asthma     Chronic cough     Depression     Disease of thyroid gland     hypo    Esophageal reflux     Fatigue     High cholesterol     Otitis media     Pharyngitis     URI (upper respiratory infection)        Past Surgical History:   Procedure Laterality Date     SECTION      CHOLECYSTECTOMY      [place] COLONOSCOPY FLX DX W/COLLJ SPEC WHEN PFRMD N/A 2016    Procedure: EGD AND COLONOSCOPY;  Surgeon: Suzette Johnson MD;  Location: [facility]; Service: Gastroenterology       Past OB/Gyn History:  Period Cycle (Days): (post menopausal )No LMP recorded (lmp unknown)   Patient is postmenopausal     Menstrual History:  OB History     Katina 4    Para   2    Term   2            AB   2    Living   2       SAB   2    TAB        Ectopic        Multiple        Live Births   2                  No LMP recorded (lmp unknown)  Patient is postmenopausal   Period Cycle (Days): (post menopausal )    Menstrual history: Patient is post menopausal    History of sexually transmitted infection: No  Patient is currently sexually active  Birth control: postmenopausal  Last Pap  2015 :  no abnormalities      Family History   Problem Relation Age of Onset    Hypertension Father         essential    Hypertension Maternal Grandmother         essential    Hypertension Maternal Grandfather         essential    Diabetes Paternal Grandmother         mellitus    Hypertension Paternal Grandmother         essential    Stroke Paternal Grandmother         syndrome    Colon cancer Family        Social History:  Social History     Socioeconomic History    Marital status: /Civil Union     Spouse name: Not on file    Number of children: Not on file    Years of education: Not on file    Highest education level: Not on file   Occupational History    Not on file   Social Needs    Financial resource strain: Not on file    Food insecurity:     Worry: Not on file     Inability: Not on file    Transportation needs:     Medical: Not on file     Non-medical: Not on file   Tobacco Use    Smoking status: Never Smoker    Smokeless tobacco: Never Used   Substance and Sexual Activity    Alcohol use: Not Currently    Drug use: Never    Sexual activity: Yes     Partners: Male     Birth control/protection: Post-menopausal   Lifestyle    Physical activity:     Days per week: Not on file     Minutes per session: Not on file    Stress: Not on file   Relationships    Social connections:     Talks on phone: Not on file     Gets together: Not on file     Attends [employer] service: Not on file     Active member of club or organization: Not on file     Attends meetings of clubs or organizations: Not on file     Relationship status: Not on file    Intimate partner violence:     Fear of current or ex partner: Not on file     Emotionally abused: Not on file     Physically abused: Not on file     Forced sexual activity: Not on file   Other Topics Concern    Not on file   Social History Narrative    Daily cola/tea consumption     Presently lives with , sons  Patient is   Patient is currently employed  Allergies   Allergen Reactions    Aspirin     Codeine          Current Outpatient Medications:     albuterol (PROVENTIL HFA,VENTOLIN HFA) 90 mcg/act inhaler, Inhale 2 puffs every 6 (six) hours as needed for wheezing, Disp: 8 g, Rfl: 3    azelastine (ASTELIN) 0 1 % nasal spray, 1 spray into each nostril 2 (two) times a day  Use in each nostril as directed, Disp: , Rfl:     escitalopram (LEXAPRO) 10 mg tablet, Take 10 mg by mouth daily  , Disp: , Rfl:     levothyroxine 112 mcg tablet, Take 1 tablet (112 mcg total) by mouth daily, Disp: 90 tablet, Rfl: 1    pantoprazole (PROTONIX) 40 mg tablet, Take 40 mg by mouth as needed , Disp: , Rfl:     estradiol (ESTRACE) 0 1 mg/g vaginal cream, Insert 1 g into the vagina 3 (three) times a week, Disp: 42 5 g, Rfl: 10    Review of Systems:  Review of Systems   Constitutional: Negative for unexpected weight change  Respiratory: Negative for shortness of breath  Cardiovascular: Negative for chest pain  Gastrointestinal: Negative for abdominal distention, abdominal pain, blood in stool, constipation, nausea and vomiting  Genitourinary: Positive for dyspareunia and vaginal pain  Negative for difficulty urinating, dysuria, frequency, pelvic pain, vaginal bleeding and vaginal discharge  Neurological: Negative for headaches  Physical Exam:  /82   Ht 5' 4" (1 626 m)   Wt 94 3 kg (208 lb)   LMP  (LMP Unknown)   BMI 35 70 kg/m²    Physical Exam   Constitutional: She is oriented to person, place, and time  Vital signs are normal  She appears well-developed and well-nourished  Genitourinary: Uterus normal  Pelvic exam was performed with patient supine  There is no rash, tenderness or lesion on the right labia  There is no rash, tenderness or lesion on the left labia  Vagina exhibits no rugosity  There is erythema and tenderness in the vagina  No bleeding in the vagina  No vaginal discharge found  Right adnexum does not display mass, does not display tenderness and does not display fullness  Left adnexum does not display mass, does not display tenderness and does not display fullness  Cervix does not exhibit motion tenderness, lesion or polyp  Uterus is not enlarged, tender or irregular (is regular)  Genitourinary Comments: Moderate vaginal atrophy  No bladder tenderness     HENT:   Head: Normocephalic  Neck: No thyromegaly present  Cardiovascular: Normal rate, regular rhythm and normal heart sounds  Pulmonary/Chest: Effort normal and breath sounds normal  No respiratory distress  Right breast exhibits no inverted nipple, no mass, no nipple discharge, no skin change and no tenderness  Left breast exhibits no inverted nipple, no mass, no nipple discharge, no skin change and no tenderness  Abdominal: Soft  She exhibits no distension  There is no tenderness  Neurological: She is alert and oriented to person, place, and time  Psychiatric: She has a normal mood and affect  Her behavior is normal    Vitals reviewed

## 2019-10-24 DIAGNOSIS — Z12.31 ENCOUNTER FOR SCREENING MAMMOGRAM FOR MALIGNANT NEOPLASM OF BREAST: ICD-10-CM

## 2019-10-25 LAB
HPV HR 12 DNA CVX QL NAA+PROBE: NEGATIVE
HPV16 DNA CVX QL NAA+PROBE: NEGATIVE
HPV18 DNA CVX QL NAA+PROBE: NEGATIVE

## 2019-10-29 LAB
LAB AP GYN PRIMARY INTERPRETATION: NORMAL
Lab: NORMAL

## 2019-11-05 DIAGNOSIS — F32.A DEPRESSION, UNSPECIFIED DEPRESSION TYPE: Primary | ICD-10-CM

## 2019-11-05 RX ORDER — ESCITALOPRAM OXALATE 10 MG/1
10 TABLET ORAL DAILY
Qty: 90 TABLET | Refills: 3 | Status: SHIPPED | OUTPATIENT
Start: 2019-11-05 | End: 2020-09-12 | Stop reason: SDUPTHER

## 2020-02-11 DIAGNOSIS — J11.1 INFLUENZA: Primary | ICD-10-CM

## 2020-02-11 DIAGNOSIS — E03.9 HYPOTHYROIDISM, UNSPECIFIED TYPE: Primary | ICD-10-CM

## 2020-02-11 RX ORDER — OSELTAMIVIR PHOSPHATE 75 MG/1
75 CAPSULE ORAL DAILY
Qty: 10 CAPSULE | Refills: 0 | Status: SHIPPED | OUTPATIENT
Start: 2020-02-11 | End: 2020-02-21

## 2020-04-15 ENCOUNTER — TELEMEDICINE (OUTPATIENT)
Dept: FAMILY MEDICINE CLINIC | Facility: CLINIC | Age: 55
End: 2020-04-15
Payer: COMMERCIAL

## 2020-04-15 DIAGNOSIS — J01.90 ACUTE SINUSITIS, RECURRENCE NOT SPECIFIED, UNSPECIFIED LOCATION: Primary | ICD-10-CM

## 2020-04-15 PROCEDURE — 99214 OFFICE O/P EST MOD 30 MIN: CPT | Performed by: FAMILY MEDICINE

## 2020-04-15 RX ORDER — CEFUROXIME AXETIL 500 MG/1
500 TABLET ORAL EVERY 12 HOURS SCHEDULED
Qty: 20 TABLET | Refills: 0 | Status: SHIPPED | OUTPATIENT
Start: 2020-04-15 | End: 2020-04-25

## 2020-04-15 RX ORDER — PSEUDOEPHEDRINE HCL 60 MG/1
60 TABLET ORAL EVERY 6 HOURS PRN
Qty: 96 TABLET | Refills: 0 | Status: SHIPPED | OUTPATIENT
Start: 2020-04-15 | End: 2020-09-11 | Stop reason: ALTCHOICE

## 2020-07-04 DIAGNOSIS — E03.9 HYPOTHYROIDISM, UNSPECIFIED TYPE: ICD-10-CM

## 2020-07-06 RX ORDER — LEVOTHYROXINE SODIUM 112 UG/1
TABLET ORAL
Qty: 90 TABLET | Refills: 1 | Status: SHIPPED | OUTPATIENT
Start: 2020-07-06 | End: 2021-01-07

## 2020-09-11 ENCOUNTER — OFFICE VISIT (OUTPATIENT)
Dept: FAMILY MEDICINE CLINIC | Facility: CLINIC | Age: 55
End: 2020-09-11
Payer: COMMERCIAL

## 2020-09-11 VITALS
DIASTOLIC BLOOD PRESSURE: 84 MMHG | SYSTOLIC BLOOD PRESSURE: 122 MMHG | OXYGEN SATURATION: 99 % | HEIGHT: 64 IN | TEMPERATURE: 98.4 F | BODY MASS INDEX: 36.02 KG/M2 | WEIGHT: 211 LBS | HEART RATE: 82 BPM

## 2020-09-11 DIAGNOSIS — E03.9 HYPOTHYROIDISM, UNSPECIFIED TYPE: ICD-10-CM

## 2020-09-11 DIAGNOSIS — E78.00 HYPERCHOLESTEROLEMIA: ICD-10-CM

## 2020-09-11 DIAGNOSIS — R53.83 OTHER FATIGUE: ICD-10-CM

## 2020-09-11 DIAGNOSIS — M79.671 FOOT PAIN, RIGHT: ICD-10-CM

## 2020-09-11 DIAGNOSIS — Z00.00 ANNUAL PHYSICAL EXAM: Primary | ICD-10-CM

## 2020-09-11 DIAGNOSIS — K21.9 LARYNGOPHARYNGEAL REFLUX (LPR): ICD-10-CM

## 2020-09-11 PROCEDURE — 1036F TOBACCO NON-USER: CPT | Performed by: FAMILY MEDICINE

## 2020-09-11 PROCEDURE — 99214 OFFICE O/P EST MOD 30 MIN: CPT | Performed by: FAMILY MEDICINE

## 2020-09-11 PROCEDURE — 99396 PREV VISIT EST AGE 40-64: CPT | Performed by: FAMILY MEDICINE

## 2020-09-11 RX ORDER — MELOXICAM 15 MG/1
15 TABLET ORAL DAILY
Qty: 30 TABLET | Refills: 0 | Status: SHIPPED | OUTPATIENT
Start: 2020-09-11 | End: 2020-12-15 | Stop reason: ALTCHOICE

## 2020-09-11 RX ORDER — PANTOPRAZOLE SODIUM 40 MG/1
40 TABLET, DELAYED RELEASE ORAL
Qty: 90 TABLET | Refills: 5 | Status: SHIPPED | OUTPATIENT
Start: 2020-09-11

## 2020-09-11 NOTE — PATIENT INSTRUCTIONS

## 2020-09-11 NOTE — PROGRESS NOTES
[address]    NAME: Kika Neves  AGE: 48 y o  SEX: female  : 1965     DATE: 2020     Assessment and Plan:     Problem List Items Addressed This Visit     None          Immunizations and preventive care screenings were discussed with patient today  Appropriate education was printed on patient's after visit summary  Counseling:  · Exercise: the importance of regular exercise/physical activity was discussed  Recommend exercise 3-5 times per week for at least 30 minutes  No follow-ups on file  Chief Complaint:     Chief Complaint   Patient presents with    Annual Exam      History of Present Illness:     Adult Annual Physical   Patient here for a comprehensive physical exam  The patient reports no problems  Diet and Physical Activity  · Diet/Nutrition: well balanced diet  · Exercise: no formal exercise  Depression Screening  PHQ-9 Depression Screening    PHQ-9:    Frequency of the following problems over the past two weeks:            General Health  · Sleep: sleeps well  · Hearing: normal - bilateral   · Vision: no vision problems  · Dental: regular dental visits  /GYN Health  · Patient is: perimenopausal  · Last menstrual period: August  · Contraceptive method:       Review of Systems:     Review of Systems   Constitutional: Negative for appetite change, chills and fever  HENT: Negative for ear pain, facial swelling, rhinorrhea, sinus pain, sore throat and trouble swallowing  Eyes: Negative for discharge and redness  Respiratory: Negative for chest tightness, shortness of breath and wheezing  Cardiovascular: Negative for chest pain and palpitations  Gastrointestinal: Negative for abdominal pain, diarrhea, nausea and vomiting  Endocrine: Negative for polyuria  Genitourinary: Negative for dysuria and urgency  Musculoskeletal: Negative for arthralgias and back pain  Skin: Negative for rash  Neurological: Negative for dizziness, weakness and headaches  Hematological: Negative for adenopathy  Psychiatric/Behavioral: Negative for behavioral problems, confusion and sleep disturbance  All other systems reviewed and are negative  Past Medical History:     Past Medical History:   Diagnosis Date    Acute gastritis     Asthma     Chronic cough     Depression     Disease of thyroid gland     hypo    Esophageal reflux     Fatigue     High cholesterol     Otitis media     Pharyngitis     URI (upper respiratory infection)       Past Surgical History:     Past Surgical History:   Procedure Laterality Date     SECTION      CHOLECYSTECTOMY      CA COLONOSCOPY FLX DX W/COLLJ SPEC WHEN PFRMD N/A 2016    Procedure: EGD AND COLONOSCOPY;  Surgeon: Lillie Leos MD;  Location: [facility];   Service: Gastroenterology      Social History:        Social History     Socioeconomic History    Marital status: /Civil Union     Spouse name: None    Number of children: None    Years of education: None    Highest education level: None   Occupational History    None   Social Needs    Financial resource strain: None    Food insecurity     Worry: None     Inability: None    Transportation needs     Medical: None     Non-medical: None   Tobacco Use    Smoking status: Never Smoker    Smokeless tobacco: Never Used   Substance and Sexual Activity    Alcohol use: Not Currently    Drug use: Never    Sexual activity: Yes     Partners: Male     Birth control/protection: Post-menopausal   Lifestyle    Physical activity     Days per week: None     Minutes per session: None    Stress: None   Relationships    Social connections     Talks on phone: None     Gets together: None     Attends Sabianism service: None     Active member of club or organization: None     Attends meetings of clubs or organizations: None     Relationship status: None    Intimate partner violence     Fear of current or ex partner: None     Emotionally abused: None     Physically abused: None     Forced sexual activity: None   Other Topics Concern    None   Social History Narrative    Daily cola/tea consumption      Family History:     Family History   Problem Relation Age of Onset    Hypertension Father         essential    Hypertension Maternal Grandmother         essential    Hypertension Maternal Grandfather         essential    Diabetes Paternal Grandmother         mellitus    Hypertension Paternal Grandmother         essential    Stroke Paternal Grandmother         syndrome    Colon cancer Family       Current Medications:     Current Outpatient Medications   Medication Sig Dispense Refill    albuterol (PROVENTIL HFA,VENTOLIN HFA) 90 mcg/act inhaler Inhale 2 puffs every 6 (six) hours as needed for wheezing 8 g 3    azelastine (ASTELIN) 0 1 % nasal spray 1 spray into each nostril 2 (two) times a day  Use in each nostril as directed      escitalopram (LEXAPRO) 10 mg tablet Take 1 tablet (10 mg total) by mouth daily (Patient taking differently: Take 15 mg by mouth daily ) 90 tablet 3    estradiol (ESTRACE) 0 1 mg/g vaginal cream Insert 1 g into the vagina 3 (three) times a week 42 5 g 10    levothyroxine 112 mcg tablet TAKE 1 TABLET BY MOUTH EVERY DAY 90 tablet 1     No current facility-administered medications for this visit  Allergies: Allergies   Allergen Reactions    Aspirin     Codeine       Physical Exam:     /84   Pulse 82   Temp 98 4 °F (36 9 °C)   Ht 5' 4" (1 626 m)   Wt 95 7 kg (211 lb)   LMP  (LMP Unknown)   SpO2 99%   BMI 36 22 kg/m²      BMI Counseling: Body mass index is 36 22 kg/m²  The BMI is above normal  Nutrition recommendations include reducing portion sizes and decreasing overall calorie intake  Physical Exam  Vitals signs and nursing note reviewed  Constitutional:       General: She is not in acute distress       Appearance: Normal appearance  She is not ill-appearing or diaphoretic  HENT:      Head: Normocephalic and atraumatic  Right Ear: Tympanic membrane, ear canal and external ear normal       Left Ear: Tympanic membrane, ear canal and external ear normal       Nose: Nose normal  No congestion or rhinorrhea  Mouth/Throat:      Mouth: Mucous membranes are moist       Pharynx: Oropharynx is clear  No posterior oropharyngeal erythema  Eyes:      General:         Right eye: No discharge  Left eye: No discharge  Extraocular Movements: Extraocular movements intact  Conjunctiva/sclera: Conjunctivae normal       Pupils: Pupils are equal, round, and reactive to light  Neck:      Musculoskeletal: Normal range of motion and neck supple  No neck rigidity  Vascular: No carotid bruit  Cardiovascular:      Rate and Rhythm: Normal rate and regular rhythm  Pulses: Normal pulses  Heart sounds: Normal heart sounds  No murmur  Pulmonary:      Effort: Pulmonary effort is normal  No respiratory distress  Breath sounds: Normal breath sounds  No wheezing or rhonchi  Abdominal:      General: Abdomen is flat  Bowel sounds are normal  There is no distension  Palpations: There is no mass  Tenderness: There is no abdominal tenderness  Musculoskeletal: Normal range of motion  General: No swelling or deformity  Right lower leg: No edema  Left lower leg: No edema  Lymphadenopathy:      Cervical: No cervical adenopathy  Skin:     General: Skin is warm and dry  Capillary Refill: Capillary refill takes less than 2 seconds  Coloration: Skin is not jaundiced  Findings: No bruising, erythema or rash  Neurological:      General: No focal deficit present  Mental Status: She is alert and oriented to person, place, and time  Cranial Nerves: No cranial nerve deficit  Sensory: No sensory deficit        Gait: Gait normal       Deep Tendon Reflexes: Reflexes normal    Psychiatric:         Mood and Affect: Mood normal          Behavior: Behavior normal          Thought Content:  Thought content normal          Judgment: Judgment normal           [place], [id number] [address]

## 2020-09-12 DIAGNOSIS — F32.A DEPRESSION, UNSPECIFIED DEPRESSION TYPE: ICD-10-CM

## 2020-09-14 RX ORDER — ESCITALOPRAM OXALATE 10 MG/1
10 TABLET ORAL DAILY
Qty: 90 TABLET | Refills: 0 | Status: SHIPPED | OUTPATIENT
Start: 2020-09-14 | End: 2020-09-15 | Stop reason: SDUPTHER

## 2020-09-14 NOTE — PROGRESS NOTES
Patient ID: Karely Buck is a 48 y o  female  HPI: 48 y  o female presents with complaints of several weeks of right foot pain when weight bearing with no known injury   She denies any swelling or bruising; pain is on top of foot between second and third toes  She also has LPR and is not on any meds  She felt better when she was on protonix and actually is looking to get a second opnion regarding this  In addition, she is due for cholesterol check and thyroid check       SUBJECTIVE    Family History   Problem Relation Age of Onset    Hypertension Father         essential    Hypertension Maternal Grandmother         essential    Hypertension Maternal Grandfather         essential    Diabetes Paternal Grandmother         mellitus    Hypertension Paternal Grandmother         essential    Stroke Paternal Grandmother         syndrome    Colon cancer Family      Social History     Socioeconomic History    Marital status: /Civil Union     Spouse name: Not on file    Number of children: Not on file    Years of education: Not on file    Highest education level: Not on file   Occupational History    Not on file   Social Needs    Financial resource strain: Not on file    Food insecurity     Worry: Not on file     Inability: Not on file   [employer] needs     Medical: Not on file     Non-medical: Not on file   Tobacco Use    Smoking status: Never Smoker    Smokeless tobacco: Never Used   Substance and Sexual Activity    Alcohol use: Not Currently    Drug use: Never    Sexual activity: Yes     Partners: Male     Birth control/protection: Post-menopausal   Lifestyle    Physical activity     Days per week: Not on file     Minutes per session: Not on file    Stress: Not on file   Relationships    Social connections     Talks on phone: Not on file     Gets together: Not on file     Attends Episcopalian service: Not on file     Active member of club or organization: Not on file     Attends meetings of clubs or organizations: Not on file     Relationship status: Not on file    Intimate partner violence     Fear of current or ex partner: Not on file     Emotionally abused: Not on file     Physically abused: Not on file     Forced sexual activity: Not on file   Other Topics Concern    Not on file   Social History Narrative    Daily cola/tea consumption     Past Medical History:   Diagnosis Date    Acute gastritis     Asthma     Chronic cough     Depression     Disease of thyroid gland     hypo    Esophageal reflux     Fatigue     High cholesterol     Otitis media     Pharyngitis     URI (upper respiratory infection)      Past Surgical History:   Procedure Laterality Date     SECTION      CHOLECYSTECTOMY      VT COLONOSCOPY FLX DX W/COLLJ SPEC WHEN PFRMD N/A 2016    Procedure: EGD AND COLONOSCOPY;  Surgeon: Indra Maravilla MD;  Location: [facility]; Service: Gastroenterology     Allergies   Allergen Reactions    Aspirin     Codeine        Current Outpatient Medications:     albuterol (PROVENTIL HFA,VENTOLIN HFA) 90 mcg/act inhaler, Inhale 2 puffs every 6 (six) hours as needed for wheezing, Disp: 8 g, Rfl: 3    azelastine (ASTELIN) 0 1 % nasal spray, 1 spray into each nostril 2 (two) times a day   Use in each nostril as directed, Disp: , Rfl:     estradiol (ESTRACE) 0 1 mg/g vaginal cream, Insert 1 g into the vagina 3 (three) times a week, Disp: 42 5 g, Rfl: 10    levothyroxine 112 mcg tablet, TAKE 1 TABLET BY MOUTH EVERY DAY, Disp: 90 tablet, Rfl: 1    escitalopram (LEXAPRO) 10 mg tablet, Take 1 tablet (10 mg total) by mouth daily, Disp: 90 tablet, Rfl: 0    meloxicam (MOBIC) 15 mg tablet, Take 1 tablet (15 mg total) by mouth daily, Disp: 30 tablet, Rfl: 0    pantoprazole (PROTONIX) 40 mg tablet, Take 1 tablet (40 mg total) by mouth daily before breakfast, Disp: 90 tablet, Rfl: 5    Review of Systems  Constitutional:     Denies fever, chills ,weakness ,weight loss, weight gain  + fatigue   ENT: Denies earache ,loss of hearing ,nosebleed, nasal discharge,nasal congestion ,sore throat ,hoarseness  Pulmonary: Denies shortness of breath  ,dyspnea on exertion, orthopnea  ,PND + dry cough   Cardiovascular:  Denies bradycardia , tachycardia  ,palpations, lower extremity edema leg, claudication  Breast:  Denies new or changing breast lumps ,nipple discharge ,nipple changes  Abdomen:  Denies abdominal pain , anorexia , indigestion, nausea, vomiting, constipation, diarrhea  Musculoskeletal: Denies myalgias, arthralgias, joint swelling, joint stiffness , limb pain, limb swelling+ right foot pain   Gu: denies dysuria, polyuria  Skin: Denies skin rash, skin lesion, skin wound, itching, dry skin  Neuro: Denies headache, numbness, tingling, confusion, loss of consciousness, dizziness, vertigo  Psychiatric: Denies feelings of depression, suicidal ideation, anxiety, sleep disturbances    OBJECTIVE  /84   Pulse 82   Temp 98 4 °F (36 9 °C)   Ht 5' 4" (1 626 m)   Wt 95 7 kg (211 lb)   LMP  (LMP Unknown)   SpO2 99%   BMI 36 22 kg/m²   Constitutional:   NAD, well appearing and well nourished      ENT:   Conjunctiva and lids: no injection, edema, or discharge     Pupils and iris: SANTI bilaterally    External inspection of ears and nose: normal without deformities or discharge  Otoscopic exam: Canals patent without erythema  Nasal mucosa, septum and turbinates: Normal or edema or discharge         Oropharynx:  Moist mucosa, normal tongue and tonsils without lesions  No erythema        Pulmonary:Respiratory effort normal rate and rhythm, no increased work of breathing   Auscultation of lungs:  Clear bilaterally with no adventitious breath sounds       Cardiovascular: regular rate and rhythm, S1 and S2, no murmur, no edema and/or varicosities of LE      Abdomen: Soft and non-distended     Positive bowel sounds      No heptomegaly or splenomegaly      Gu: no suprapubic tenderness or CVA tenderness, no urethral discharge  Lymphatic:  No anterior or posterior cervical lymphadenopathy         Musculoskeletal:  Gait and station: Normal gait      Digits and nails normal without clubbing or cyanosis       Inspection/palpation of joints, bones, and muscles:  + right foot  Tenderness on dorsum between 2nd and 3rd toes,no  swelling, full active and passive range of motion of right foot      Skin: Normal skin turgor and no rashes      Neuro:   Normal reflexes     Psych:   alert and oriented to person, place and time     normal mood and affect       Assessment/Plan:Diagnoses and all orders for this visit:    Annual physical exam    Foot pain, right  -     XR foot 3+ vw right; Future  -     meloxicam (MOBIC) 15 mg tablet; Take 1 tablet (15 mg total) by mouth daily    Laryngopharyngeal reflux (LPR)  -     pantoprazole (PROTONIX) 40 mg tablet; Take 1 tablet (40 mg total) by mouth daily before breakfast    Hypercholesterolemia  -     Lipid Panel with Direct LDL reflex; Future    Other fatigue  -     Comprehensive metabolic panel; Future    Hypothyroidism, unspecified type  -     TSH, 3rd generation; Future        Reviewed with patient plan to treat with above plan      Patient instructed to call in 72 hours if not feeling better or if symptoms worsen

## 2020-09-15 ENCOUNTER — TRANSCRIBE ORDERS (OUTPATIENT)
Dept: ADMINISTRATIVE | Age: 55
End: 2020-09-15

## 2020-09-15 ENCOUNTER — APPOINTMENT (OUTPATIENT)
Dept: RADIOLOGY | Age: 55
End: 2020-09-15
Payer: COMMERCIAL

## 2020-09-15 DIAGNOSIS — F32.A DEPRESSION, UNSPECIFIED DEPRESSION TYPE: ICD-10-CM

## 2020-09-15 DIAGNOSIS — M79.671 FOOT PAIN, RIGHT: ICD-10-CM

## 2020-09-15 PROCEDURE — 73630 X-RAY EXAM OF FOOT: CPT

## 2020-09-15 RX ORDER — ESCITALOPRAM OXALATE 10 MG/1
15 TABLET ORAL DAILY
Qty: 135 TABLET | Refills: 3 | Status: SHIPPED | OUTPATIENT
Start: 2020-09-15 | End: 2021-10-28

## 2020-10-09 ENCOUNTER — IMMUNIZATIONS (OUTPATIENT)
Dept: FAMILY MEDICINE CLINIC | Facility: CLINIC | Age: 55
End: 2020-10-09
Payer: COMMERCIAL

## 2020-10-09 DIAGNOSIS — Z23 ENCOUNTER FOR IMMUNIZATION: ICD-10-CM

## 2020-10-09 PROCEDURE — 90682 RIV4 VACC RECOMBINANT DNA IM: CPT | Performed by: FAMILY MEDICINE

## 2020-10-09 PROCEDURE — 90471 IMMUNIZATION ADMIN: CPT | Performed by: FAMILY MEDICINE

## 2020-10-09 PROCEDURE — 90472 IMMUNIZATION ADMIN EACH ADD: CPT | Performed by: FAMILY MEDICINE

## 2020-10-09 PROCEDURE — 90670 PCV13 VACCINE IM: CPT | Performed by: FAMILY MEDICINE

## 2020-10-26 ENCOUNTER — ANNUAL EXAM (OUTPATIENT)
Dept: OBGYN CLINIC | Facility: CLINIC | Age: 55
End: 2020-10-26
Payer: COMMERCIAL

## 2020-10-26 VITALS
HEIGHT: 64 IN | WEIGHT: 208 LBS | TEMPERATURE: 98.8 F | SYSTOLIC BLOOD PRESSURE: 118 MMHG | BODY MASS INDEX: 35.51 KG/M2 | DIASTOLIC BLOOD PRESSURE: 72 MMHG

## 2020-10-26 DIAGNOSIS — Z01.419 WOMEN'S ANNUAL ROUTINE GYNECOLOGICAL EXAMINATION: Primary | ICD-10-CM

## 2020-10-26 DIAGNOSIS — M79.671 FOOT PAIN, RIGHT: Primary | ICD-10-CM

## 2020-10-26 DIAGNOSIS — N95.2 POSTMENOPAUSAL ATROPHIC VAGINITIS: ICD-10-CM

## 2020-10-26 DIAGNOSIS — Z12.31 ENCOUNTER FOR SCREENING MAMMOGRAM FOR MALIGNANT NEOPLASM OF BREAST: ICD-10-CM

## 2020-10-26 PROCEDURE — 99396 PREV VISIT EST AGE 40-64: CPT | Performed by: OBSTETRICS & GYNECOLOGY

## 2020-10-26 PROCEDURE — 3008F BODY MASS INDEX DOCD: CPT | Performed by: ORTHOPAEDIC SURGERY

## 2020-10-26 RX ORDER — ESTRADIOL 0.1 MG/G
1 CREAM VAGINAL 3 TIMES WEEKLY
Qty: 42.5 G | Refills: 10 | Status: SHIPPED | OUTPATIENT
Start: 2020-10-26 | End: 2022-01-28 | Stop reason: SDUPTHER

## 2020-10-30 DIAGNOSIS — Z12.31 ENCOUNTER FOR SCREENING MAMMOGRAM FOR MALIGNANT NEOPLASM OF BREAST: ICD-10-CM

## 2020-11-11 DIAGNOSIS — M84.374A STRESS FRACTURE OF RIGHT FOOT, INITIAL ENCOUNTER: Primary | ICD-10-CM

## 2020-11-12 ENCOUNTER — APPOINTMENT (OUTPATIENT)
Dept: RADIOLOGY | Facility: CLINIC | Age: 55
End: 2020-11-12
Payer: COMMERCIAL

## 2020-11-12 ENCOUNTER — OFFICE VISIT (OUTPATIENT)
Dept: OBGYN CLINIC | Facility: CLINIC | Age: 55
End: 2020-11-12
Payer: COMMERCIAL

## 2020-11-12 DIAGNOSIS — M79.671 PAIN IN RIGHT FOOT: ICD-10-CM

## 2020-11-12 DIAGNOSIS — M84.374A STRESS FRACTURE OF RIGHT FOOT, INITIAL ENCOUNTER: ICD-10-CM

## 2020-11-12 DIAGNOSIS — T14.8XXA BONE BRUISE: Primary | ICD-10-CM

## 2020-11-12 PROCEDURE — 1036F TOBACCO NON-USER: CPT | Performed by: ORTHOPAEDIC SURGERY

## 2020-11-12 PROCEDURE — 73620 X-RAY EXAM OF FOOT: CPT

## 2020-11-12 PROCEDURE — 99243 OFF/OP CNSLTJ NEW/EST LOW 30: CPT | Performed by: ORTHOPAEDIC SURGERY

## 2020-12-15 ENCOUNTER — OFFICE VISIT (OUTPATIENT)
Dept: FAMILY MEDICINE CLINIC | Facility: CLINIC | Age: 55
End: 2020-12-15
Payer: COMMERCIAL

## 2020-12-15 VITALS
BODY MASS INDEX: 36.19 KG/M2 | HEART RATE: 81 BPM | TEMPERATURE: 97.7 F | DIASTOLIC BLOOD PRESSURE: 80 MMHG | WEIGHT: 212 LBS | HEIGHT: 64 IN | SYSTOLIC BLOOD PRESSURE: 120 MMHG | OXYGEN SATURATION: 97 %

## 2020-12-15 DIAGNOSIS — I80.9 SUPERFICIAL PHLEBITIS: Primary | ICD-10-CM

## 2020-12-15 PROCEDURE — 3008F BODY MASS INDEX DOCD: CPT | Performed by: ORTHOPAEDIC SURGERY

## 2020-12-15 PROCEDURE — 99213 OFFICE O/P EST LOW 20 MIN: CPT | Performed by: FAMILY MEDICINE

## 2020-12-15 RX ORDER — METHYLPREDNISOLONE 4 MG/1
TABLET ORAL
Qty: 21 EACH | Refills: 0 | Status: SHIPPED | OUTPATIENT
Start: 2020-12-15 | End: 2022-01-28

## 2021-01-07 DIAGNOSIS — E03.9 HYPOTHYROIDISM, UNSPECIFIED TYPE: ICD-10-CM

## 2021-01-07 RX ORDER — LEVOTHYROXINE SODIUM 112 UG/1
TABLET ORAL
Qty: 90 TABLET | Refills: 1 | Status: SHIPPED | OUTPATIENT
Start: 2021-01-07 | End: 2021-04-29 | Stop reason: SDUPTHER

## 2021-01-14 DIAGNOSIS — Z23 ENCOUNTER FOR IMMUNIZATION: ICD-10-CM

## 2021-02-25 ENCOUNTER — TELEPHONE (OUTPATIENT)
Dept: FAMILY MEDICINE CLINIC | Facility: CLINIC | Age: 56
End: 2021-02-25

## 2021-02-25 NOTE — TELEPHONE ENCOUNTER
----- Message from Elsy Campos sent at 2/25/2021  7:33 AM EST -----  Regarding: FW: Non-Urgent Medical Question  Contact: [phone]    ----- Message -----  From: Luciano Hodges  Sent: 2/24/2021   9:00 PM EST  To: [facility]  Subject: Non-Urgent Medical Question                      Dr Mario Marroquin,    We are concerned about Edd Becerra, he started driving and we have noticed times where he seems to loose focus and we have at times had to [facility] to get his attention to slow down or stop  It at times has taken him what seems like too long to react  For many years I have often wondered if he might have a processing disorder or attention/focus issue  At this point [place] Lennox and I feel we need to have this evaluated for his physical safety most importantly but also as he approaches college entry and workforce we want him set up for success  The driving scenario I think has made this concern thats been in the background more concerning   Wondering if you might be able to give me a phone call to talk about this and what options there might be? [phone] You, dAams Tan

## 2021-04-29 DIAGNOSIS — R05.9 COUGH: ICD-10-CM

## 2021-04-29 DIAGNOSIS — E03.9 HYPOTHYROIDISM, UNSPECIFIED TYPE: ICD-10-CM

## 2021-04-29 RX ORDER — ALBUTEROL SULFATE 90 UG/1
2 AEROSOL, METERED RESPIRATORY (INHALATION) EVERY 6 HOURS PRN
Qty: 8 G | Refills: 0 | Status: CANCELLED | OUTPATIENT
Start: 2021-04-29

## 2021-04-30 RX ORDER — LEVOTHYROXINE SODIUM 112 UG/1
112 TABLET ORAL DAILY
Qty: 90 TABLET | Refills: 0 | Status: SHIPPED | OUTPATIENT
Start: 2021-04-30 | End: 2021-07-06

## 2021-04-30 RX ORDER — ALBUTEROL SULFATE 90 UG/1
2 AEROSOL, METERED RESPIRATORY (INHALATION) EVERY 6 HOURS PRN
Qty: 8 G | Refills: 3 | Status: SHIPPED | OUTPATIENT
Start: 2021-04-30 | End: 2022-05-10 | Stop reason: SDUPTHER

## 2021-09-30 ENCOUNTER — TELEPHONE (OUTPATIENT)
Dept: FAMILY MEDICINE CLINIC | Facility: CLINIC | Age: 56
End: 2021-09-30

## 2021-09-30 ENCOUNTER — PATIENT MESSAGE (OUTPATIENT)
Dept: FAMILY MEDICINE CLINIC | Facility: CLINIC | Age: 56
End: 2021-09-30

## 2021-09-30 DIAGNOSIS — R05.9 COUGHING: Primary | ICD-10-CM

## 2021-09-30 PROCEDURE — U0003 INFECTIOUS AGENT DETECTION BY NUCLEIC ACID (DNA OR RNA); SEVERE ACUTE RESPIRATORY SYNDROME CORONAVIRUS 2 (SARS-COV-2) (CORONAVIRUS DISEASE [COVID-19]), AMPLIFIED PROBE TECHNIQUE, MAKING USE OF HIGH THROUGHPUT TECHNOLOGIES AS DESCRIBED BY CMS-2020-01-R: HCPCS | Performed by: FAMILY MEDICINE

## 2021-09-30 PROCEDURE — U0005 INFEC AGEN DETEC AMPLI PROBE: HCPCS | Performed by: FAMILY MEDICINE

## 2021-09-30 RX ORDER — AZITHROMYCIN 250 MG/1
TABLET, FILM COATED ORAL
Qty: 6 TABLET | Refills: 0 | Status: SHIPPED | OUTPATIENT
Start: 2021-09-30 | End: 2021-10-05

## 2021-09-30 NOTE — TELEPHONE ENCOUNTER
From: Abel Emmanuel  To: Harrison Ramirez DO  Sent: 9/30/2021 8:08 AM EDT  Subject: Prescription Question    Dr Abi Ji- both the boys have had what seemed to be cold symptoms then myself, I now have a very significant cough  My guess is maybe a touch of bronchitis  Tried albuterol inhaler and 1 dose of tessalon but which helped a tiny bit but still hacking cough  Not sure what the process for appts are now, or if you might consider prescribing something  Im fully vaccinated with Lean[employer] Line   Please advise as to how you can help me, what steps to follow

## 2021-09-30 NOTE — TELEPHONE ENCOUNTER
Patient started Sunday with bad coughing congestion, tight in chest, burning in chest,   no fever   No SOB just when she coughs a lot     She gets bronchitis often and she says that's what it feels like    Please advise

## 2021-09-30 NOTE — TELEPHONE ENCOUNTER
She should be tested for COVID  Was she immunized? I can call in an antibiotic, but she should get tested    Order entered - she can go to [facility] after 5p

## 2021-10-06 ENCOUNTER — OFFICE VISIT (OUTPATIENT)
Dept: FAMILY MEDICINE CLINIC | Facility: CLINIC | Age: 56
End: 2021-10-06
Payer: COMMERCIAL

## 2021-10-06 VITALS
HEART RATE: 80 BPM | HEIGHT: 64 IN | BODY MASS INDEX: 35 KG/M2 | WEIGHT: 205 LBS | DIASTOLIC BLOOD PRESSURE: 82 MMHG | SYSTOLIC BLOOD PRESSURE: 128 MMHG | TEMPERATURE: 98.6 F

## 2021-10-06 DIAGNOSIS — J20.9 ACUTE BRONCHITIS, UNSPECIFIED ORGANISM: Primary | ICD-10-CM

## 2021-10-06 PROCEDURE — 99213 OFFICE O/P EST LOW 20 MIN: CPT | Performed by: FAMILY MEDICINE

## 2021-10-06 PROCEDURE — 96372 THER/PROPH/DIAG INJ SC/IM: CPT | Performed by: FAMILY MEDICINE

## 2021-10-06 PROCEDURE — 1036F TOBACCO NON-USER: CPT | Performed by: FAMILY MEDICINE

## 2021-10-06 PROCEDURE — 3008F BODY MASS INDEX DOCD: CPT | Performed by: FAMILY MEDICINE

## 2021-10-06 RX ORDER — BENZONATATE 200 MG/1
200 CAPSULE ORAL 3 TIMES DAILY PRN
Qty: 30 CAPSULE | Refills: 0 | Status: SHIPPED | OUTPATIENT
Start: 2021-10-06 | End: 2022-05-03

## 2021-10-06 RX ORDER — AZITHROMYCIN 250 MG/1
TABLET, FILM COATED ORAL
Qty: 6 TABLET | Refills: 0 | Status: SHIPPED | OUTPATIENT
Start: 2021-10-06 | End: 2021-10-10

## 2021-10-06 RX ORDER — PREDNISONE 10 MG/1
TABLET ORAL
Qty: 26 TABLET | Refills: 0 | Status: SHIPPED | OUTPATIENT
Start: 2021-10-06 | End: 2022-01-28

## 2021-10-06 RX ORDER — METHYLPREDNISOLONE ACETATE 80 MG/ML
80 INJECTION, SUSPENSION INTRA-ARTICULAR; INTRALESIONAL; INTRAMUSCULAR; SOFT TISSUE ONCE
Status: COMPLETED | OUTPATIENT
Start: 2021-10-06 | End: 2021-10-06

## 2021-10-06 RX ADMIN — METHYLPREDNISOLONE ACETATE 80 MG: 80 INJECTION, SUSPENSION INTRA-ARTICULAR; INTRALESIONAL; INTRAMUSCULAR; SOFT TISSUE at 12:09

## 2021-10-28 DIAGNOSIS — F32.A DEPRESSION, UNSPECIFIED DEPRESSION TYPE: ICD-10-CM

## 2021-10-28 RX ORDER — ESCITALOPRAM OXALATE 10 MG/1
TABLET ORAL
Qty: 135 TABLET | Refills: 3 | Status: SHIPPED | OUTPATIENT
Start: 2021-10-28 | End: 2022-08-01 | Stop reason: SDUPTHER

## 2021-12-14 DIAGNOSIS — F41.9 ANXIETY: Primary | ICD-10-CM

## 2021-12-14 RX ORDER — ALPRAZOLAM 0.25 MG/1
0.25 TABLET ORAL 3 TIMES DAILY PRN
Qty: 60 TABLET | Refills: 0 | Status: SHIPPED | OUTPATIENT
Start: 2021-12-14 | End: 2022-06-16

## 2021-12-16 ENCOUNTER — TELEPHONE (OUTPATIENT)
Dept: FAMILY MEDICINE CLINIC | Facility: CLINIC | Age: 56
End: 2021-12-16

## 2021-12-16 DIAGNOSIS — Z20.822 SUSPECTED COVID-19 VIRUS INFECTION: Primary | ICD-10-CM

## 2021-12-16 PROCEDURE — U0003 INFECTIOUS AGENT DETECTION BY NUCLEIC ACID (DNA OR RNA); SEVERE ACUTE RESPIRATORY SYNDROME CORONAVIRUS 2 (SARS-COV-2) (CORONAVIRUS DISEASE [COVID-19]), AMPLIFIED PROBE TECHNIQUE, MAKING USE OF HIGH THROUGHPUT TECHNOLOGIES AS DESCRIBED BY CMS-2020-01-R: HCPCS | Performed by: FAMILY MEDICINE

## 2021-12-16 PROCEDURE — U0005 INFEC AGEN DETEC AMPLI PROBE: HCPCS | Performed by: FAMILY MEDICINE

## 2022-01-28 ENCOUNTER — ANNUAL EXAM (OUTPATIENT)
Dept: OBGYN CLINIC | Facility: CLINIC | Age: 57
End: 2022-01-28
Payer: COMMERCIAL

## 2022-01-28 VITALS
WEIGHT: 211 LBS | BODY MASS INDEX: 36.02 KG/M2 | DIASTOLIC BLOOD PRESSURE: 70 MMHG | SYSTOLIC BLOOD PRESSURE: 122 MMHG | HEIGHT: 64 IN

## 2022-01-28 DIAGNOSIS — Z11.51 SCREENING FOR HPV (HUMAN PAPILLOMAVIRUS): ICD-10-CM

## 2022-01-28 DIAGNOSIS — Z12.31 ENCOUNTER FOR SCREENING MAMMOGRAM FOR MALIGNANT NEOPLASM OF BREAST: ICD-10-CM

## 2022-01-28 DIAGNOSIS — Z12.4 ENCOUNTER FOR SCREENING FOR MALIGNANT NEOPLASM OF CERVIX: ICD-10-CM

## 2022-01-28 DIAGNOSIS — N95.2 POSTMENOPAUSAL ATROPHIC VAGINITIS: ICD-10-CM

## 2022-01-28 DIAGNOSIS — Z01.419 WELL FEMALE EXAM WITH ROUTINE GYNECOLOGICAL EXAM: Primary | ICD-10-CM

## 2022-01-28 PROCEDURE — 99396 PREV VISIT EST AGE 40-64: CPT | Performed by: OBSTETRICS & GYNECOLOGY

## 2022-01-28 PROCEDURE — G0145 SCR C/V CYTO,THINLAYER,RESCR: HCPCS | Performed by: OBSTETRICS & GYNECOLOGY

## 2022-01-28 PROCEDURE — 3008F BODY MASS INDEX DOCD: CPT | Performed by: OBSTETRICS & GYNECOLOGY

## 2022-01-28 PROCEDURE — 1036F TOBACCO NON-USER: CPT | Performed by: OBSTETRICS & GYNECOLOGY

## 2022-01-28 PROCEDURE — G0476 HPV COMBO ASSAY CA SCREEN: HCPCS | Performed by: OBSTETRICS & GYNECOLOGY

## 2022-01-28 RX ORDER — ESTRADIOL 0.1 MG/G
1 CREAM VAGINAL 3 TIMES WEEKLY
Qty: 42.5 G | Refills: 10 | Status: SHIPPED | OUTPATIENT
Start: 2022-01-28

## 2022-01-28 NOTE — PROGRESS NOTES
ASSESSMENT & PLAN:   Diagnoses and all orders for this visit:    Well female exam with routine gynecological exam  Encounter for screening for malignant neoplasm of cervix  Screening for HPV (human papillomavirus)  -     Liquid-based pap, screening    Encounter for screening mammogram for malignant neoplasm of breast  -     Mammo screening bilateral w 3d & cad; Future    Postmenopausal atrophic vaginitis  -     estradiol (ESTRACE) 0 1 mg/g vaginal cream; Insert 1 g into the vagina 3 (three) times a week      The following were reviewed in today's visit: ASCCP guidelines, Gardisil vaccination, STD testing breast self exam, mammography screening ordered, menopause, exercise and healthy diet  Patient to return to office in yearly for annual exam      All questions have been answered to her satisfaction  CC:  Annual Gynecologic Examination  Chief Complaint   Patient presents with    Gynecologic Exam     Pt is here for her annual exam and pap smear  Pt is doing well today, no complaints  Last pap was 10/2019 and was normal  Pt's last mammo was 22 and last colonoscopy was 2016  HPI: Mihir Ann is a 55 y o  [id number] who presents for annual gynecologic examination  She has the following concerns:  Refills on estrace         Health Maintenance:    Exercise: rarely  Breast exams/breast awareness: no  Diet: well balanced diet  Last mammogram: 2021  History of abnormal mammogram: no  Colorectal cancer screenin      Past Medical History:   Diagnosis Date    Acute gastritis     Asthma     Chronic cough     Depression     Disease of thyroid gland     hypo    Esophageal reflux     Fatigue     High cholesterol     Otitis media     Pharyngitis     URI (upper respiratory infection)        Past Surgical History:   Procedure Laterality Date     SECTION      CHOLECYSTECTOMY      AL COLONOSCOPY FLX DX W/COLLJ SPEC WHEN PFRMD N/A 2016    Procedure: EGD AND COLONOSCOPY; Surgeon: Samira Matias MD;  Location[facility]; Service: Gastroenterology       Past OB/Gyn History:   No LMP recorded (lmp unknown)  Patient is postmenopausal     Menopausal status: postmenopausal  Menopausal symptoms: None    Last Pap: 2019 : no abnormalities  History of abnormal Pap smear: no    Patient is currently sexually active     STD testing: no  Current contraception: post menopausal status      Family History  Family History   Problem Relation Age of Onset    Hypertension Father         essential    Hypertension Maternal Grandmother         essential    Hypertension Maternal Grandfather         essential    Diabetes Paternal Grandmother         mellitus    Hypertension Paternal Grandmother         essential    Stroke Paternal Grandmother         syndrome    Colon cancer Family     No Known Problems Mother     No Known Problems Brother     No Known Problems Son     No Known Problems Son        Family history of uterine or ovarian cancer: no  Family history of breast cancer: no  Family history of colon cancer: yes    Social History:  Social History     Socioeconomic History    Marital status: /Civil Union     Spouse name: Not on file    Number of children: Not on file    Years of education: Not on file    Highest education level: Not on file   Occupational History    Not on file   Tobacco Use    Smoking status: Never Smoker    Smokeless tobacco: Never Used   Vaping Use    Vaping Use: Never used   Substance and Sexual Activity    Alcohol use: Not Currently    Drug use: Never    Sexual activity: Yes     Partners: Male     Birth control/protection: Post-menopausal   Other Topics Concern    Not on file   Social History Narrative    Daily cola/tea consumption     Social Determinants of Health     Financial Resource Strain: Not on file   Food Insecurity: Not on file   Transportation Needs: Not on file   Physical Activity: Not on file   Stress: Not on file   Social Connections: Not on file   Intimate Partner Violence: Not on file   Housing Stability: Not on file     Domestic violence screen: negative    Allergies: Allergies   Allergen Reactions    Aspirin        Medications:    Current Outpatient Medications:     albuterol (PROVENTIL HFA,VENTOLIN HFA) 90 mcg/act inhaler, Inhale 2 puffs every 6 (six) hours as needed for wheezing, Disp: 8 g, Rfl: 3    ALPRAZolam (XANAX) 0 25 mg tablet, Take 1 tablet (0 25 mg total) by mouth 3 (three) times a day as needed for anxiety, Disp: 60 tablet, Rfl: 0    azelastine (ASTELIN) 0 1 % nasal spray, 1 spray into each nostril 2 (two) times a day  Use in each nostril as directed, Disp: , Rfl:     benzonatate (TESSALON) 200 MG capsule, Take 1 capsule (200 mg total) by mouth 3 (three) times a day as needed for cough, Disp: 30 capsule, Rfl: 0    escitalopram (LEXAPRO) 10 mg tablet, TAKE 1 AND 1/2 TABLETS BY  MOUTH DAILY, Disp: 135 tablet, Rfl: 3    estradiol (ESTRACE) 0 1 mg/g vaginal cream, Insert 1 g into the vagina 3 (three) times a week, Disp: 42 5 g, Rfl: 10    levothyroxine 112 mcg tablet, TAKE 1 TABLET BY MOUTH  DAILY, Disp: 90 tablet, Rfl: 3    pantoprazole (PROTONIX) 40 mg tablet, Take 1 tablet (40 mg total) by mouth daily before breakfast, Disp: 90 tablet, Rfl: 5    Review of Systems:  Review of Systems   Constitutional: Positive for unexpected weight change  Negative for chills and fever  Respiratory: Negative for cough and shortness of breath  Cardiovascular: Negative for chest pain and palpitations  Gastrointestinal: Negative for abdominal distention, abdominal pain, blood in stool, constipation, nausea and vomiting  Genitourinary: Negative for difficulty urinating, dyspareunia, frequency, pelvic pain, urgency, vaginal bleeding, vaginal discharge and vaginal pain  Neurological: Negative for headaches           Physical Exam:  /70 (BP Location: Left arm, Patient Position: Sitting, Cuff Size: Extra-Large)   Ht 5' 4" (1 626 m) Wt 95 7 kg (211 lb)   LMP  (LMP Unknown)   BMI 36 22 kg/m²    Physical Exam  Constitutional:       General: She is awake  Appearance: Normal appearance  She is well-developed  Genitourinary:      Vulva and bladder normal       Right Labia: No rash, tenderness or lesions  Left Labia: No tenderness, lesions or rash  No vaginal discharge, erythema, tenderness or bleeding  Anterior vaginal prolapse present  Mild vaginal atrophy present  Right Adnexa: not tender, not full and no mass present  Left Adnexa: not tender, not full and no mass present  Cervix is parous  No cervical motion tenderness, discharge, lesion or polyp  Uterus is not enlarged, tender or irregular  No uterine mass detected  No urethral prolapse present  Bladder is not tender  Pelvic exam was performed with patient in the lithotomy position  Breasts:      Right: No inverted nipple, mass, nipple discharge, skin change, tenderness, axillary adenopathy or supraclavicular adenopathy  Left: No inverted nipple, mass, nipple discharge, skin change, tenderness, axillary adenopathy or supraclavicular adenopathy  HENT:      Head: Normocephalic and atraumatic  Cardiovascular:      Rate and Rhythm: Normal rate and regular rhythm  Heart sounds: Normal heart sounds  Pulmonary:      Effort: Pulmonary effort is normal  No tachypnea or respiratory distress  Breath sounds: Normal breath sounds  Abdominal:      General: Abdomen is flat  There is no distension  Palpations: Abdomen is soft  Tenderness: There is no abdominal tenderness  There is no guarding or rebound  Musculoskeletal:      Cervical back: Neck supple  Lymphadenopathy:      Upper Body:      Right upper body: No supraclavicular or axillary adenopathy  Left upper body: No supraclavicular or axillary adenopathy  Neurological:      General: No focal deficit present        Mental Status: She is alert and oriented to person, place, and time  Psychiatric:         Mood and Affect: Mood normal          Behavior: Behavior normal          Thought Content: Thought content normal          Judgment: Judgment normal    Vitals reviewed

## 2022-02-01 NOTE — RESULT ENCOUNTER NOTE
Mayank Martinez Knows,     Your HPV testing is negative  Your pap is still pending, and will be updated soon  Please contact the office at [phone] with any questions       Jeannie

## 2022-02-03 LAB
LAB AP GYN PRIMARY INTERPRETATION: NORMAL
Lab: NORMAL

## 2022-02-11 DIAGNOSIS — N76.0 ACUTE VAGINITIS: Primary | ICD-10-CM

## 2022-02-11 RX ORDER — CLINDAMYCIN PHOSPHATE 20 MG/G
1 CREAM VAGINAL
Qty: 40 G | Refills: 0 | Status: SHIPPED | OUTPATIENT
Start: 2022-02-11 | End: 2022-02-18

## 2022-02-23 DIAGNOSIS — J06.9 UPPER RESPIRATORY TRACT INFECTION, UNSPECIFIED TYPE: Primary | ICD-10-CM

## 2022-02-23 RX ORDER — CEFUROXIME AXETIL 500 MG/1
500 TABLET ORAL EVERY 12 HOURS SCHEDULED
Qty: 20 TABLET | Refills: 0 | Status: SHIPPED | OUTPATIENT
Start: 2022-02-23 | End: 2022-03-05

## 2022-02-23 RX ORDER — BROMPHENIRAMINE MALEATE, PSEUDOEPHEDRINE HYDROCHLORIDE, AND DEXTROMETHORPHAN HYDROBROMIDE 2; 30; 10 MG/5ML; MG/5ML; MG/5ML
10 SYRUP ORAL 4 TIMES DAILY PRN
Qty: 180 ML | Refills: 0 | Status: SHIPPED | OUTPATIENT
Start: 2022-02-23 | End: 2022-05-03

## 2022-04-24 LAB
ALBUMIN SERPL-MCNC: 4.1 G/DL (ref 3.6–5.1)
ALBUMIN/GLOB SERPL: 1.3 (CALC) (ref 1–2.5)
ALP SERPL-CCNC: 78 U/L (ref 37–153)
ALT SERPL-CCNC: 18 U/L (ref 6–29)
AST SERPL-CCNC: 18 U/L (ref 10–35)
BILIRUB SERPL-MCNC: 0.4 MG/DL (ref 0.2–1.2)
BUN SERPL-MCNC: 19 MG/DL (ref 7–25)
BUN/CREAT SERPL: NORMAL (CALC) (ref 6–22)
CALCIUM SERPL-MCNC: 9.8 MG/DL (ref 8.6–10.4)
CHLORIDE SERPL-SCNC: 101 MMOL/L (ref 98–110)
CHOLEST SERPL-MCNC: 241 MG/DL
CHOLEST/HDLC SERPL: 4.6 (CALC)
CO2 SERPL-SCNC: 29 MMOL/L (ref 20–32)
CREAT SERPL-MCNC: 0.8 MG/DL (ref 0.5–1.05)
ERYTHROCYTE [DISTWIDTH] IN BLOOD BY AUTOMATED COUNT: 13 % (ref 11–15)
GLOBULIN SER CALC-MCNC: 3.2 G/DL (CALC) (ref 1.9–3.7)
GLUCOSE SERPL-MCNC: 95 MG/DL (ref 65–99)
HCT VFR BLD AUTO: 42.7 % (ref 35–45)
HDLC SERPL-MCNC: 52 MG/DL
HGB BLD-MCNC: 14.2 G/DL (ref 11.7–15.5)
LDLC SERPL CALC-MCNC: 158 MG/DL (CALC)
MCH RBC QN AUTO: 29.3 PG (ref 27–33)
MCHC RBC AUTO-ENTMCNC: 33.3 G/DL (ref 32–36)
MCV RBC AUTO: 88 FL (ref 80–100)
NONHDLC SERPL-MCNC: 189 MG/DL (CALC)
PLATELET # BLD AUTO: 318 THOUSAND/UL (ref 140–400)
PMV BLD REES-ECKER: 9.9 FL (ref 7.5–12.5)
POTASSIUM SERPL-SCNC: 4.3 MMOL/L (ref 3.5–5.3)
PROT SERPL-MCNC: 7.3 G/DL (ref 6.1–8.1)
RBC # BLD AUTO: 4.85 MILLION/UL (ref 3.8–5.1)
SL AMB EGFR AFRICAN AMERICAN: 96 ML/MIN/1.73M2
SL AMB EGFR NON AFRICAN AMERICAN: 82 ML/MIN/1.73M2
SODIUM SERPL-SCNC: 139 MMOL/L (ref 135–146)
T3FREE SERPL-MCNC: 3 PG/ML (ref 2.3–4.2)
TRIGL SERPL-MCNC: 170 MG/DL
TSH SERPL-ACNC: 1.39 MIU/L (ref 0.4–4.5)
WBC # BLD AUTO: 5.8 THOUSAND/UL (ref 3.8–10.8)

## 2022-04-26 ENCOUNTER — RA CDI HCC (OUTPATIENT)
Dept: OTHER | Facility: HOSPITAL | Age: 57
End: 2022-04-26

## 2022-04-26 NOTE — PROGRESS NOTES
[facility]  coding opportunities          Chart Reviewed number of suggestions sent to Provider: 2     Patients Insurance        Commercial Insurance: [employer]     Based on clinical documentation indicated in your record, it appears that the patient may have the following conditions:    E66 01 Morbid Obesity 36 22 (BMI 36 22 with GERD)    Could the depression noted be further classified to one of the following codes?     F33 0  Major depressive disorder, recurrent, mild (HCC) OR  F33 1 Major depressive disorder, recurrent, moderate (HCC)  OR  F33 2  Major depressive disorder, recurrent, severe without psychotic features ([facility] ) OR  F33 8   Other recurrent depressive disorders ([facility] ) OR  F33 9   Major depressive disorder, recurrent, unspecified ([facility] )      If this is correct, please document and assess at your next visit, May 3

## 2022-05-03 ENCOUNTER — OFFICE VISIT (OUTPATIENT)
Dept: FAMILY MEDICINE CLINIC | Facility: CLINIC | Age: 57
End: 2022-05-03
Payer: COMMERCIAL

## 2022-05-03 VITALS
DIASTOLIC BLOOD PRESSURE: 74 MMHG | WEIGHT: 209 LBS | TEMPERATURE: 98 F | SYSTOLIC BLOOD PRESSURE: 116 MMHG | BODY MASS INDEX: 35.68 KG/M2 | HEIGHT: 64 IN | HEART RATE: 78 BPM

## 2022-05-03 DIAGNOSIS — Z00.00 ANNUAL PHYSICAL EXAM: Primary | ICD-10-CM

## 2022-05-03 DIAGNOSIS — E78.00 HYPERCHOLESTEROLEMIA: ICD-10-CM

## 2022-05-03 DIAGNOSIS — J45.909 UNCOMPLICATED ASTHMA, UNSPECIFIED ASTHMA SEVERITY, UNSPECIFIED WHETHER PERSISTENT: ICD-10-CM

## 2022-05-03 DIAGNOSIS — E03.9 HYPOTHYROIDISM, UNSPECIFIED TYPE: ICD-10-CM

## 2022-05-03 DIAGNOSIS — M79.10 MYALGIA: ICD-10-CM

## 2022-05-03 PROCEDURE — 99396 PREV VISIT EST AGE 40-64: CPT | Performed by: FAMILY MEDICINE

## 2022-05-03 PROCEDURE — 1036F TOBACCO NON-USER: CPT | Performed by: FAMILY MEDICINE

## 2022-05-03 PROCEDURE — 3008F BODY MASS INDEX DOCD: CPT | Performed by: FAMILY MEDICINE

## 2022-05-03 PROCEDURE — 99214 OFFICE O/P EST MOD 30 MIN: CPT | Performed by: FAMILY MEDICINE

## 2022-05-03 RX ORDER — OMEGA-3-ACID ETHYL ESTERS 1 G/1
1 CAPSULE, LIQUID FILLED ORAL 2 TIMES DAILY
Qty: 180 CAPSULE | Refills: 3 | Status: SHIPPED | OUTPATIENT
Start: 2022-05-03 | End: 2022-06-16

## 2022-05-03 RX ORDER — PREDNISONE 1 MG/1
TABLET ORAL
Qty: 26 TABLET | Refills: 0 | Status: SHIPPED | OUTPATIENT
Start: 2022-05-03 | End: 2022-05-03 | Stop reason: SDUPTHER

## 2022-05-03 RX ORDER — PREDNISONE 1 MG/1
TABLET ORAL
Qty: 26 TABLET | Refills: 0 | Status: SHIPPED | OUTPATIENT
Start: 2022-05-03 | End: 2022-06-16 | Stop reason: ALTCHOICE

## 2022-05-03 NOTE — PATIENT INSTRUCTIONS

## 2022-05-03 NOTE — PROGRESS NOTES
[facility]    NAME: Steffany Haynes  AGE: 65 y o  SEX: female  : 1965     DATE: 5/3/2022     Assessment and Plan:     Problem List Items Addressed This Visit     None          Immunizations and preventive care screenings were discussed with patient today  Appropriate education was printed on patient's after visit summary  Counseling:  · Exercise: the importance of regular exercise/physical activity was discussed  Recommend exercise 3-5 times per week for at least 30 minutes  BMI Counseling: Body mass index is 35 87 kg/m²  The BMI is above normal  Nutrition recommendations include reducing portion sizes  No follow-ups on file  Chief Complaint:     Chief Complaint   Patient presents with    Physical Exam      History of Present Illness:     Adult Annual Physical   Patient here for a comprehensive physical exam  The patient reports no problems  Diet and Physical Activity  · Diet/Nutrition: well balanced diet  · Exercise: no formal exercise  Depression Screening  PHQ-2/9 Depression Screening         General Health  · Sleep: sleeps well  · Hearing: normal - bilateral   · Vision: no vision problems  · Dental: regular dental visits  /GYN Health  · Patient is: postmenopausal  · Last menstrual period:   · Contraceptive method:       Review of Systems:     Review of Systems   Constitutional: Negative for appetite change, chills and fever  HENT: Negative for ear pain, facial swelling, rhinorrhea, sinus pain, sore throat and trouble swallowing  Eyes: Negative for discharge and redness  Respiratory: Negative for chest tightness, shortness of breath and wheezing  Cardiovascular: Negative for chest pain and palpitations  Gastrointestinal: Negative for abdominal pain, diarrhea, nausea and vomiting  Endocrine: Negative for polyuria  Genitourinary: Negative for dysuria and urgency  Musculoskeletal: Negative for arthralgias and back pain  Skin: Negative for rash  Neurological: Negative for dizziness, weakness and headaches  Hematological: Negative for adenopathy  Psychiatric/Behavioral: Negative for behavioral problems, confusion and sleep disturbance  All other systems reviewed and are negative  Past Medical History:     Past Medical History:   Diagnosis Date    Acute gastritis     Asthma     Chronic cough     Depression     Disease of thyroid gland     hypo    Esophageal reflux     Fatigue     High cholesterol     Otitis media     Pharyngitis     URI (upper respiratory infection)       Past Surgical History:     Past Surgical History:   Procedure Laterality Date     SECTION      CHOLECYSTECTOMY      VT COLONOSCOPY FLX DX W/COLLJ SPEC WHEN PFRMD N/A 2016    Procedure: EGD AND COLONOSCOPY;  Surgeon: Alessandra Bailey MD;  Location[facility];   Service: Gastroenterology      Social History:     Social History     Socioeconomic History    Marital status: /Civil Union     Spouse name: Not on file    Number of children: Not on file    Years of education: Not on file    Highest education level: Not on file   Occupational History    Not on file   Tobacco Use    Smoking status: Never Smoker    Smokeless tobacco: Never Used   Vaping Use    Vaping Use: Never used   Substance and Sexual Activity    Alcohol use: Not Currently    Drug use: Never    Sexual activity: Yes     Partners: Male     Birth control/protection: Post-menopausal   Other Topics Concern    Not on file   Social History Narrative    Daily cola/tea consumption     Social Determinants of Health     Financial Resource Strain: Not on file   Food Insecurity: Not on file   Transportation Needs: Not on file   Physical Activity: Not on file   Stress: Not on file   Social Connections: Not on file   Intimate Partner Violence: Not on file   Housing Stability: Not on file      Family History: Family History   Problem Relation Age of Onset    Hypertension Father         essential    Hypertension Maternal Grandmother         essential    Hypertension Maternal Grandfather         essential    Diabetes Paternal Grandmother         mellitus    Hypertension Paternal Grandmother         essential    Stroke Paternal Grandmother         syndrome    Colon cancer Family     No Known Problems Mother     No Known Problems Brother     No Known Problems Son     No Known Problems Son       Current Medications:     Current Outpatient Medications   Medication Sig Dispense Refill    albuterol (PROVENTIL HFA,VENTOLIN HFA) 90 mcg/act inhaler Inhale 2 puffs every 6 (six) hours as needed for wheezing 8 g 3    ALPRAZolam (XANAX) 0 25 mg tablet Take 1 tablet (0 25 mg total) by mouth 3 (three) times a day as needed for anxiety 60 tablet 0    azelastine (ASTELIN) 0 1 % nasal spray 1 spray into each nostril 2 (two) times a day  Use in each nostril as directed      escitalopram (LEXAPRO) 10 mg tablet TAKE 1 AND 1/2 TABLETS BY  MOUTH DAILY 135 tablet 3    estradiol (ESTRACE) 0 1 mg/g vaginal cream Insert 1 g into the vagina 3 (three) times a week 42 5 g 10    levothyroxine 112 mcg tablet TAKE 1 TABLET BY MOUTH  DAILY 90 tablet 3    pantoprazole (PROTONIX) 40 mg tablet Take 1 tablet (40 mg total) by mouth daily before breakfast 90 tablet 5     No current facility-administered medications for this visit  Allergies: Allergies   Allergen Reactions    Aspirin       Physical Exam:     LMP  (LMP Unknown)     Physical Exam  Vitals and nursing note reviewed  Constitutional:       General: She is not in acute distress  Appearance: Normal appearance  She is not ill-appearing or diaphoretic  HENT:      Head: Normocephalic and atraumatic        Right Ear: Tympanic membrane, ear canal and external ear normal       Left Ear: Tympanic membrane, ear canal and external ear normal       Nose: Nose normal  No congestion or rhinorrhea  Mouth/Throat:      Mouth: Mucous membranes are moist       Pharynx: Oropharynx is clear  No posterior oropharyngeal erythema  Eyes:      General:         Right eye: No discharge  Left eye: No discharge  Extraocular Movements: Extraocular movements intact  Conjunctiva/sclera: Conjunctivae normal       Pupils: Pupils are equal, round, and reactive to light  Neck:      Vascular: No carotid bruit  Cardiovascular:      Rate and Rhythm: Normal rate and regular rhythm  Pulses: Normal pulses  Heart sounds: Normal heart sounds  No murmur heard  Pulmonary:      Effort: Pulmonary effort is normal  No respiratory distress  Breath sounds: Normal breath sounds  No wheezing or rhonchi  Abdominal:      General: Abdomen is flat  Bowel sounds are normal  There is no distension  Palpations: There is no mass  Tenderness: There is no abdominal tenderness  Musculoskeletal:         General: No swelling or deformity  Normal range of motion  Cervical back: Normal range of motion and neck supple  No rigidity  Right lower leg: No edema  Left lower leg: No edema  Lymphadenopathy:      Cervical: No cervical adenopathy  Skin:     General: Skin is warm and dry  Capillary Refill: Capillary refill takes less than 2 seconds  Coloration: Skin is not jaundiced  Findings: No bruising, erythema or rash  Neurological:      General: No focal deficit present  Mental Status: She is alert and oriented to person, place, and time  Cranial Nerves: No cranial nerve deficit  Sensory: No sensory deficit  Gait: Gait normal       Deep Tendon Reflexes: Reflexes normal    Psychiatric:         Mood and Affect: Mood normal          Behavior: Behavior normal          Thought Content:  Thought content normal          Judgment: Judgment normal           Macario Webber, [id number] [address] [facility]

## 2022-05-10 DIAGNOSIS — R05.9 COUGH: ICD-10-CM

## 2022-05-10 LAB
ANA SER QL IF: NEGATIVE
B BURGDOR AB SER IA-ACNC: <0.9 INDEX
B19V IGG SER IA-ACNC: 0.2
CK SERPL-CCNC: 67 U/L (ref 29–143)
CRP SERPL-MCNC: 6.6 MG/L
ENA SS-A AB SER IA-ACNC: NORMAL AI
ENA SS-B AB SER IA-ACNC: NORMAL AI
LDH SERPL-CCNC: 151 U/L (ref 120–250)

## 2022-05-10 RX ORDER — ALBUTEROL SULFATE 90 UG/1
2 AEROSOL, METERED RESPIRATORY (INHALATION) EVERY 6 HOURS PRN
Qty: 8 G | Refills: 0 | Status: SHIPPED | OUTPATIENT
Start: 2022-05-10

## 2022-05-16 NOTE — PROGRESS NOTES
Patient ID: Nora Cooper is a 65 y o  female  HPI: 65 y  o female presents for follow up of hypercholesterolemia  Recent lipid panel was unfavorable and pt started on lovaza because she is experiencing diffuse myalgia  Her thyroid is stable as is her asthma        SUBJECTIVE    Family History   Problem Relation Age of Onset    Hypertension Father         essential    Hypertension Maternal Grandmother         essential    Hypertension Maternal Grandfather         essential    Diabetes Paternal Grandmother         mellitus    Hypertension Paternal Grandmother         essential    Stroke Paternal Grandmother         syndrome    Colon cancer Family     No Known Problems Mother     No Known Problems Brother     No Known Problems Son     No Known Problems Son      Social History     Socioeconomic History    Marital status: /Civil Union     Spouse name: Not on file    Number of children: Not on file    Years of education: Not on file    Highest education level: Not on file   Occupational History    Not on file   Tobacco Use    Smoking status: Never Smoker    Smokeless tobacco: Never Used   Vaping Use    Vaping Use: Never used   Substance and Sexual Activity    Alcohol use: Not Currently    Drug use: Never    Sexual activity: Yes     Partners: Male     Birth control/protection: Post-menopausal   Other Topics Concern    Not on file   Social History Narrative    Daily cola/tea consumption     Social Determinants of Health     Financial Resource Strain: Not on file   Food Insecurity: Not on file   Transportation Needs: Not on file   Physical Activity: Not on file   Stress: Not on file   Social Connections: Not on file   Intimate Partner Violence: Not on file   Housing Stability: Not on file     Past Medical History:   Diagnosis Date    Acute gastritis     Asthma     Chronic cough     Depression     Disease of thyroid gland     hypo    Esophageal reflux     Fatigue     High cholesterol  Otitis media     Pharyngitis     URI (upper respiratory infection)      Past Surgical History:   Procedure Laterality Date     SECTION      CHOLECYSTECTOMY      SD COLONOSCOPY FLX DX W/COLLJ SPEC WHEN PFRMD N/A 2016    Procedure: EGD AND COLONOSCOPY;  Surgeon: Alisson Newman MD;  [facility]; Service: Gastroenterology     Allergies   Allergen Reactions    Aspirin        Current Outpatient Medications:     azelastine (ASTELIN) 0 1 % nasal spray, 1 spray into each nostril 2 (two) times a day   Use in each nostril as directed, Disp: , Rfl:     escitalopram (LEXAPRO) 10 mg tablet, TAKE 1 AND 1/2 TABLETS BY  MOUTH DAILY, Disp: 135 tablet, Rfl: 3    estradiol (ESTRACE) 0 1 mg/g vaginal cream, Insert 1 g into the vagina 3 (three) times a week, Disp: 42 5 g, Rfl: 10    levothyroxine 112 mcg tablet, TAKE 1 TABLET BY MOUTH  DAILY, Disp: 90 tablet, Rfl: 3    pantoprazole (PROTONIX) 40 mg tablet, Take 1 tablet (40 mg total) by mouth daily before breakfast, Disp: 90 tablet, Rfl: 5    albuterol (PROVENTIL HFA,VENTOLIN HFA) 90 mcg/act inhaler, Inhale 2 puffs every 6 (six) hours as needed for wheezing, Disp: 8 g, Rfl: 0    ALPRAZolam (XANAX) 0 25 mg tablet, Take 1 tablet (0 25 mg total) by mouth 3 (three) times a day as needed for anxiety, Disp: 60 tablet, Rfl: 0    omega-3-acid ethyl esters (LOVAZA) 1 g capsule, Take 1 capsule (1 g total) by mouth 2 (two) times a day, Disp: 180 capsule, Rfl: 3    predniSONE 5 mg tablet, 3 po bid x2 days then 2 po bid x2days, then 1 po bid x2days then 1 qd and stop, Disp: 26 tablet, Rfl: 0    Review of Systems  Constitutional:     Denies fever, chills ,fatigue ,weakness ,weight loss, weight gain     ENT: Denies earache ,loss of hearing ,nosebleed, nasal discharge,nasal congestion ,sore throat ,hoarseness  Pulmonary: Denies shortness of breath ,cough  ,dyspnea on exertion, orthopnea  ,PND   Cardiovascular:  Denies bradycardia , tachycardia  ,palpations, lower extremity edema leg, claudication  Breast:  Denies new or changing breast lumps ,nipple discharge ,nipple changes  Abdomen:  Denies abdominal pain , anorexia , indigestion, nausea, vomiting, constipation, diarrhea  Musculoskeletal: Denies myalgias, arthralgias, joint swelling, joint stiffness , limb pain, limb swelling  Gu: denies dysuria, polyuria  Skin: Denies skin rash, skin lesion, skin wound, itching, dry skin  Neuro: Denies headache, numbness, tingling, confusion, loss of consciousness, dizziness, vertigo  Psychiatric: Denies feelings of depression, suicidal ideation, anxiety, sleep disturbances    OBJECTIVE  /74   Pulse 78   Temp 98 °F (36 7 °C)   Ht 5' 4" (1 626 m)   Wt 94 8 kg (209 lb)   LMP  (LMP Unknown)   BMI 35 87 kg/m²   Constitutional:   NAD, well appearing and well nourished      ENT:   Conjunctiva and lids: no injection, edema, or discharge     Pupils and iris: SANTI bilaterally    External inspection of ears and nose: normal without deformities or discharge  Otoscopic exam: Canals patent without erythema  Nasal mucosa, septum and turbinates: Normal or edema or discharge       Oropharynx:  Moist mucosa, normal tongue and tonsils without lesions  No erythema        Pulmonary:Respiratory effort normal rate and rhythm, no increased work of breathing   Auscultation of lungs:  Clear bilaterally with no adventitious breath sounds       Cardiovascular: regular rate and rhythm, S1 and S2, no murmur, no edema and/or varicosities of LE      Abdomen: Soft and non-distended     Positive bowel sounds      No heptomegaly or splenomegaly      Gu: no suprapubic tenderness or CVA tenderness, no urethral discharge  Lymphatic:  No anterior or posterior cervical lymphadenopathy         Musculoskeletal:  Gait and station: Normal gait      Digits and nails normal without clubbing or cyanosis       Inspection/palpation of joints, bones, and muscles:  No joint tenderness, swelling, full active and passive range of motion       Skin: Normal skin turgor and no rashes      Neuro:    Normal  CN 2-12     Normal reflexes     Normal sensation    Psych:   alert and oriented to person, place and time     normal mood and affect       Assessment/Plan:Diagnoses and all orders for this visit:    Annual physical exam    Myalgia  -     C-reactive protein; Future  -     Antinuclear Antibodies (SANJEEV), IFA; Future  -     Parvovirus B19 antibody, IgG and IgM; Future  -     CK (with reflex to MB); Future  -     LD,Blood; Future  -     Sjogren's Antibodies; Future  -     Lyme Ab/Western Blot Reflex; Future  -     Discontinue: predniSONE 5 mg tablet; 3 po bid x2 days then 2 po bid x2days, then 1 po bid x2days then 1 qd and stop  -     predniSONE 5 mg tablet; 3 po bid x2 days then 2 po bid x2days, then 1 po bid x2days then 1 qd and stop    Uncomplicated asthma, unspecified asthma severity, unspecified whether persistent  Comments:  Stable on current tx  Hypercholesterolemia  Comments:  Stable on statin tx  Orders:  -     omega-3-acid ethyl esters (LOVAZA) 1 g capsule; Take 1 capsule (1 g total) by mouth 2 (two) times a day    Hypothyroidism, unspecified type  Comments:  Continue levothyroxine tx  I will see patient back in 4 mos or sooner prn

## 2022-05-26 DIAGNOSIS — M79.10 MYALGIA: Primary | ICD-10-CM

## 2022-05-26 RX ORDER — CELECOXIB 200 MG/1
200 CAPSULE ORAL DAILY
Qty: 30 CAPSULE | Refills: 3 | Status: SHIPPED | OUTPATIENT
Start: 2022-05-26 | End: 2022-07-19 | Stop reason: SDUPTHER

## 2022-06-16 ENCOUNTER — OFFICE VISIT (OUTPATIENT)
Dept: FAMILY MEDICINE CLINIC | Facility: CLINIC | Age: 57
End: 2022-06-16
Payer: COMMERCIAL

## 2022-06-16 VITALS
HEIGHT: 64 IN | WEIGHT: 212.6 LBS | HEART RATE: 79 BPM | SYSTOLIC BLOOD PRESSURE: 110 MMHG | TEMPERATURE: 98 F | DIASTOLIC BLOOD PRESSURE: 76 MMHG | OXYGEN SATURATION: 98 % | BODY MASS INDEX: 36.29 KG/M2

## 2022-06-16 DIAGNOSIS — J01.90 ACUTE SINUSITIS, RECURRENCE NOT SPECIFIED, UNSPECIFIED LOCATION: Primary | ICD-10-CM

## 2022-06-16 PROCEDURE — 1036F TOBACCO NON-USER: CPT | Performed by: FAMILY MEDICINE

## 2022-06-16 PROCEDURE — 3008F BODY MASS INDEX DOCD: CPT | Performed by: FAMILY MEDICINE

## 2022-06-16 PROCEDURE — 99213 OFFICE O/P EST LOW 20 MIN: CPT | Performed by: FAMILY MEDICINE

## 2022-06-16 RX ORDER — METHYLPREDNISOLONE 4 MG/1
TABLET ORAL
Qty: 21 EACH | Refills: 0 | Status: SHIPPED | OUTPATIENT
Start: 2022-06-16

## 2022-06-16 RX ORDER — CEFUROXIME AXETIL 500 MG/1
500 TABLET ORAL EVERY 12 HOURS SCHEDULED
Qty: 20 TABLET | Refills: 0 | Status: SHIPPED | OUTPATIENT
Start: 2022-06-16 | End: 2022-06-26

## 2022-06-16 RX ORDER — BROMPHENIRAMINE MALEATE, PSEUDOEPHEDRINE HYDROCHLORIDE, AND DEXTROMETHORPHAN HYDROBROMIDE 2; 30; 10 MG/5ML; MG/5ML; MG/5ML
10 SYRUP ORAL 4 TIMES DAILY PRN
Qty: 180 ML | Refills: 1 | Status: SHIPPED | OUTPATIENT
Start: 2022-06-16

## 2022-06-17 NOTE — PROGRESS NOTES
Patient ID: John Moore is a 65 y o  female  HPI: 65 y  o female presenting with symptoms of sinus pain,pressure, nasal congestion, pnd dry cough , ear and throat pain  Symptoms for past 4 days        SUBJECTIVE    Family History   Problem Relation Age of Onset    Hypertension Father         essential    Hypertension Maternal Grandmother         essential    Hypertension Maternal Grandfather         essential    Diabetes Paternal Grandmother         mellitus    Hypertension Paternal Grandmother         essential    Stroke Paternal Grandmother         syndrome    Colon cancer Family     No Known Problems Mother     No Known Problems Brother     No Known Problems Son     No Known Problems Son      Social History     Socioeconomic History    Marital status: /Civil Union     Spouse name: Not on file    Number of children: Not on file    Years of education: Not on file    Highest education level: Not on file   Occupational History    Not on file   Tobacco Use    Smoking status: Never Smoker    Smokeless tobacco: Never Used   Vaping Use    Vaping Use: Never used   Substance and Sexual Activity    Alcohol use: Not Currently    Drug use: Never    Sexual activity: Yes     Partners: Male     Birth control/protection: Post-menopausal   Other Topics Concern    Not on file   Social History Narrative    Daily cola/tea consumption     Social Determinants of Health     Financial Resource Strain: Not on file   Food Insecurity: Not on file   Transportation Needs: Not on file   Physical Activity: Not on file   Stress: Not on file   Social Connections: Not on file   Intimate Partner Violence: Not on file   Housing Stability: Not on file     Past Medical History:   Diagnosis Date    Acute gastritis     Asthma     Chronic cough     Depression     Disease of thyroid gland     hypo    Esophageal reflux     Fatigue     High cholesterol     Otitis media     Pharyngitis     URI (upper respiratory infection)      Past Surgical History:   Procedure Laterality Date     SECTION      CHOLECYSTECTOMY      DC COLONOSCOPY FLX DX W/COLLJ SPEC WHEN PFRMD N/A 2016    Procedure: EGD AND COLONOSCOPY;  Surgeon: Hermes Kan MD;  [facility];   Service: Gastroenterology     Allergies   Allergen Reactions    Aspirin        Current Outpatient Medications:     albuterol (PROVENTIL HFA,VENTOLIN HFA) 90 mcg/act inhaler, Inhale 2 puffs every 6 (six) hours as needed for wheezing, Disp: 8 g, Rfl: 0    ALPRAZolam (XANAX) 0 25 mg tablet, Take 1 tablet (0 25 mg total) by mouth 3 (three) times a day as needed for anxiety, Disp: 60 tablet, Rfl: 0    azelastine (ASTELIN) 0 1 % nasal spray, 1 spray into each nostril 2 (two) times a day Use in each nostril as directed, Disp: , Rfl:     brompheniramine-pseudoephedrine-DM 30-2-10 MG/5ML syrup, Take 10 mL by mouth 4 (four) times a day as needed for congestion or cough, Disp: 180 mL, Rfl: 1    cefuroxime (CEFTIN) 500 mg tablet, Take 1 tablet (500 mg total) by mouth every 12 (twelve) hours for 10 days, Disp: 20 tablet, Rfl: 0    celecoxib (CeleBREX) 200 mg capsule, Take 1 capsule (200 mg total) by mouth daily, Disp: 30 capsule, Rfl: 3    escitalopram (LEXAPRO) 10 mg tablet, TAKE 1 AND 1/2 TABLETS BY  MOUTH DAILY, Disp: 135 tablet, Rfl: 3    estradiol (ESTRACE) 0 1 mg/g vaginal cream, Insert 1 g into the vagina 3 (three) times a week, Disp: 42 5 g, Rfl: 10    levothyroxine 112 mcg tablet, TAKE 1 TABLET BY MOUTH  DAILY, Disp: 90 tablet, Rfl: 3    methylPREDNISolone 4 MG tablet therapy pack, Use as directed on package, Disp: 21 each, Rfl: 0    omega-3-acid ethyl esters (LOVAZA) 1 g capsule, Take 1 capsule (1 g total) by mouth 2 (two) times a day, Disp: 180 capsule, Rfl: 3    pantoprazole (PROTONIX) 40 mg tablet, Take 1 tablet (40 mg total) by mouth daily before breakfast, Disp: 90 tablet, Rfl: 5    Review of Systems  Constitutional:     Denies fever, chills, fatigue, weakness ,weight loss, weight gain      ENT: Denies earache, loss of hearing, nosebleed, nasal discharge,but complains of nasal congestion, sore throat,hoarseness and sinus pain and pressure    Pulmonary: Denies shortness of breath ,cough , dyspnea on exertionon, orthopnea ,+ PND   Cardiovascular:  Denies bradycardia , tachycardia ,palpations, lower extremity, edema leg, claudication  Breast:  Denies new or changing breast lumps,  nipple discharge, nipple changes,  Abdomen:  Denies abdominal pain , anorexia ,indigestion, nausea ,vomiting, constipation , diarrhea  Musculoskeletal: Denies myalgias, arthralgias, joint swelling, joint stiffness ,limb pain, limb swelling  Lymph:+ swollen glands  Gu: no dysuria or urinary frequency  Skin: Denies skin rash, skin lesion, skin wound, itching,dry skin  Neuro: Denies headache, numbness, tingling, confusion, loss of consciousness, dizziness ,vertigo  Psychiatric: Denies feelings of depression, suicidal ideation, anxiety, sleep disturbances    OBJECTIVE  /76   Pulse 79   Temp 98 °F (36 7 °C)   Ht 5' 4" (1 626 m)   Wt 96 4 kg (212 lb 9 6 oz)   LMP  (LMP Unknown)   SpO2 98%   BMI 36 49 kg/m²   Constitutional:   NAD, well appearing and well nourished      ENT:   Conjunctiva and lids: no injection, edema, or discharge    Pupils and iris: SANTI bilaterally   External inspection of ears and nose: normal without deformities or discharge  Otoscopic exam: Canals patent ; tm are dull, with with erythem and effusions  ENasal mucosa, septum and turbinates: Turbinae injection with discharge   Oropharynx:  Moist mucosa, normal tongue and tonsils without lesions  Erythema and injection  of post pharynx with pnd      Pulmonary:Respiratory effort normal rate and rhythm, no increased work of breathing   Auscultation of lungs:  Clear bilaterally with no adventitious breath sounds       Cardiovascular: regular rate and rhythm, S1 and S2, no murmur, no edema and/or varicosities of LE      Abdomen: Soft and non-distended    Positive bowel sounds    No heptomegaly or splenomegaly    Lymphatic: Anterior  cervical lymphadenopathy         Muscskeletal:  Gait and station: Normal gait     Digits and nails normal without clubbing or cyanosis     Inspection/palpation of joints, bones, and muscles:  No joint tenderness, swelling, full active and passive range of motion      Gu: no suprabubic tenderness, CVA tenderness or urethral discharge  Skin: Normal skin turgor and no rashes    Neuro:    Normal reflexes   Psych:   alert and oriented to person, place and time  normal mood and affec      Assessment/Plan:Diagnoses and all orders for this visit:    Acute sinusitis, recurrence not specified, unspecified location  -     brompheniramine-pseudoephedrine-DM 30-2-10 MG/5ML syrup; Take 10 mL by mouth 4 (four) times a day as needed for congestion or cough  -     cefuroxime (CEFTIN) 500 mg tablet; Take 1 tablet (500 mg total) by mouth every 12 (twelve) hours for 10 days  -     methylPREDNISolone 4 MG tablet therapy pack; Use as directed on package        Reviewed with patient plan to treat with above meds      Patient instructed to call in 72 hours if not feeling better or if symptoms worsen

## 2022-07-19 DIAGNOSIS — M79.10 MYALGIA: ICD-10-CM

## 2022-07-19 RX ORDER — CELECOXIB 200 MG/1
200 CAPSULE ORAL DAILY
Qty: 90 CAPSULE | Refills: 3 | Status: SHIPPED | OUTPATIENT
Start: 2022-07-19 | End: 2022-10-17

## 2022-08-01 DIAGNOSIS — E03.9 HYPOTHYROIDISM, UNSPECIFIED TYPE: ICD-10-CM

## 2022-08-01 DIAGNOSIS — F32.A DEPRESSION, UNSPECIFIED DEPRESSION TYPE: ICD-10-CM

## 2022-08-02 RX ORDER — ESCITALOPRAM OXALATE 10 MG/1
15 TABLET ORAL DAILY
Qty: 135 TABLET | Refills: 0 | Status: SHIPPED | OUTPATIENT
Start: 2022-08-02

## 2022-08-02 RX ORDER — LEVOTHYROXINE SODIUM 112 UG/1
112 TABLET ORAL DAILY
Qty: 90 TABLET | Refills: 0 | Status: SHIPPED | OUTPATIENT
Start: 2022-08-02 | End: 2022-09-27

## 2022-09-27 DIAGNOSIS — E03.9 HYPOTHYROIDISM, UNSPECIFIED TYPE: ICD-10-CM

## 2022-09-27 RX ORDER — LEVOTHYROXINE SODIUM 112 UG/1
TABLET ORAL
Qty: 90 TABLET | Refills: 3 | Status: SHIPPED | OUTPATIENT
Start: 2022-09-27

## 2022-10-02 DIAGNOSIS — K21.9 LARYNGOPHARYNGEAL REFLUX (LPR): ICD-10-CM

## 2022-10-03 RX ORDER — PANTOPRAZOLE SODIUM 40 MG/1
40 TABLET, DELAYED RELEASE ORAL
Qty: 90 TABLET | Refills: 0 | Status: SHIPPED | OUTPATIENT
Start: 2022-10-03

## 2022-11-27 DIAGNOSIS — K21.9 LARYNGOPHARYNGEAL REFLUX (LPR): ICD-10-CM

## 2022-11-28 RX ORDER — PANTOPRAZOLE SODIUM 40 MG/1
TABLET, DELAYED RELEASE ORAL
Qty: 90 TABLET | Refills: 3 | Status: SHIPPED | OUTPATIENT
Start: 2022-11-28

## 2022-12-08 DIAGNOSIS — Z12.31 ENCOUNTER FOR SCREENING MAMMOGRAM FOR MALIGNANT NEOPLASM OF BREAST: ICD-10-CM

## 2022-12-14 DIAGNOSIS — F32.A DEPRESSION, UNSPECIFIED DEPRESSION TYPE: ICD-10-CM

## 2022-12-15 RX ORDER — ESCITALOPRAM OXALATE 10 MG/1
TABLET ORAL
Qty: 135 TABLET | Refills: 3 | Status: SHIPPED | OUTPATIENT
Start: 2022-12-15

## 2022-12-19 ENCOUNTER — TELEPHONE (OUTPATIENT)
Dept: FAMILY MEDICINE CLINIC | Facility: CLINIC | Age: 57
End: 2022-12-19

## 2022-12-19 DIAGNOSIS — J06.9 UPPER RESPIRATORY TRACT INFECTION, UNSPECIFIED TYPE: Primary | ICD-10-CM

## 2022-12-19 RX ORDER — PREDNISONE 10 MG/1
TABLET ORAL
Qty: 26 TABLET | Refills: 0 | Status: SHIPPED | OUTPATIENT
Start: 2022-12-19

## 2022-12-19 RX ORDER — CEFUROXIME AXETIL 500 MG/1
500 TABLET ORAL EVERY 12 HOURS SCHEDULED
Qty: 20 TABLET | Refills: 0 | Status: SHIPPED | OUTPATIENT
Start: 2022-12-19 | End: 2022-12-29

## 2022-12-19 RX ORDER — BROMPHENIRAMINE MALEATE, PSEUDOEPHEDRINE HYDROCHLORIDE, AND DEXTROMETHORPHAN HYDROBROMIDE 2; 30; 10 MG/5ML; MG/5ML; MG/5ML
5 SYRUP ORAL 4 TIMES DAILY PRN
Qty: 120 ML | Refills: 0 | Status: SHIPPED | OUTPATIENT
Start: 2022-12-19

## 2022-12-19 NOTE — TELEPHONE ENCOUNTER
Patient states she is having a lot of pressure and facial pain about 4 days on her L side and today that side of her face has begun to swell    She denies any fever, chills, congestion, cough, n/v/d     She is asking for an appointment or for meds to be sent to the pharmacy     [facility] [address]

## 2023-01-02 DIAGNOSIS — E03.9 HYPOTHYROIDISM, UNSPECIFIED TYPE: ICD-10-CM

## 2023-01-03 RX ORDER — LEVOTHYROXINE SODIUM 112 UG/1
112 TABLET ORAL DAILY
Qty: 90 TABLET | Refills: 0 | Status: SHIPPED | OUTPATIENT
Start: 2023-01-03

## 2023-02-24 DIAGNOSIS — N95.2 POSTMENOPAUSAL ATROPHIC VAGINITIS: ICD-10-CM

## 2023-02-27 DIAGNOSIS — E03.9 HYPOTHYROIDISM, UNSPECIFIED TYPE: ICD-10-CM

## 2023-02-28 RX ORDER — ESTRADIOL 0.1 MG/G
CREAM VAGINAL
Qty: 42.5 G | Refills: 3 | Status: SHIPPED | OUTPATIENT
Start: 2023-02-28

## 2023-02-28 RX ORDER — LEVOTHYROXINE SODIUM 112 UG/1
TABLET ORAL
Qty: 90 TABLET | Refills: 3 | Status: SHIPPED | OUTPATIENT
Start: 2023-02-28

## 2023-03-17 DIAGNOSIS — M35.3 PMR (POLYMYALGIA RHEUMATICA) (HCC): Primary | ICD-10-CM

## 2023-03-17 RX ORDER — CELECOXIB 100 MG/1
100 CAPSULE ORAL 2 TIMES DAILY
Qty: 60 CAPSULE | Refills: 1 | Status: SHIPPED | OUTPATIENT
Start: 2023-03-17

## 2023-04-26 ENCOUNTER — RA CDI HCC (OUTPATIENT)
Dept: OTHER | Facility: HOSPITAL | Age: 58
End: 2023-04-26

## 2023-04-26 NOTE — PROGRESS NOTES
[facility]  coding opportunities          Chart Reviewed number of suggestions sent to Provider: 1     Patients Insurance        Commercial Insurance: [facility]

## 2023-05-04 ENCOUNTER — OFFICE VISIT (OUTPATIENT)
Dept: FAMILY MEDICINE CLINIC | Facility: CLINIC | Age: 58
End: 2023-05-04

## 2023-05-04 VITALS
OXYGEN SATURATION: 97 % | HEIGHT: 64 IN | DIASTOLIC BLOOD PRESSURE: 76 MMHG | TEMPERATURE: 97.9 F | BODY MASS INDEX: 34.56 KG/M2 | HEART RATE: 83 BPM | WEIGHT: 202.4 LBS | SYSTOLIC BLOOD PRESSURE: 120 MMHG

## 2023-05-04 DIAGNOSIS — S29.019A THORACIC MYOFASCIAL STRAIN, INITIAL ENCOUNTER: ICD-10-CM

## 2023-05-04 DIAGNOSIS — E03.9 HYPOTHYROIDISM, UNSPECIFIED TYPE: ICD-10-CM

## 2023-05-04 DIAGNOSIS — K12.1 VIRAL STOMATITIS: ICD-10-CM

## 2023-05-04 DIAGNOSIS — Z78.0 ASYMPTOMATIC POSTMENOPAUSAL ESTROGEN DEFICIENCY: ICD-10-CM

## 2023-05-04 DIAGNOSIS — B97.89 VIRAL STOMATITIS: ICD-10-CM

## 2023-05-04 DIAGNOSIS — Z00.00 ANNUAL PHYSICAL EXAM: Primary | ICD-10-CM

## 2023-05-04 DIAGNOSIS — M35.3 PMR (POLYMYALGIA RHEUMATICA) (HCC): ICD-10-CM

## 2023-05-04 RX ORDER — METHYLPREDNISOLONE 4 MG/1
TABLET ORAL
Qty: 14 TABLET | Refills: 0 | Status: SHIPPED | OUTPATIENT
Start: 2023-05-04

## 2023-05-04 RX ORDER — VALACYCLOVIR HYDROCHLORIDE 1 G/1
1000 TABLET, FILM COATED ORAL 3 TIMES DAILY
Qty: 21 TABLET | Refills: 0 | Status: SHIPPED | OUTPATIENT
Start: 2023-05-04 | End: 2023-05-11

## 2023-05-04 RX ORDER — METHOCARBAMOL 500 MG/1
500 TABLET, FILM COATED ORAL
Qty: 10 TABLET | Refills: 0 | Status: SHIPPED | OUTPATIENT
Start: 2023-05-04

## 2023-05-04 NOTE — PROGRESS NOTES
[address]    NAME: Michael Walker  AGE: 63 y o  SEX: female  : 1965     DATE: 2023     Assessment and Plan:     Problem List Items Addressed This Visit    None      Immunizations and preventive care screenings were discussed with patient today  Appropriate education was printed on patient's after visit summary  Counseling:  · Exercise: the importance of regular exercise/physical activity was discussed  Recommend exercise 3-5 times per week for at least 30 minutes  No follow-ups on file  Chief Complaint:     Chief Complaint   Patient presents with   • Physical Exam   • Back Pain      History of Present Illness:     Adult Annual Physical   Patient here for a comprehensive physical exam  The patient reports thoracic back pain and diffuse myalgia as well as vesicular lesions of oral mucosa  Her hypothyroidism is well controlled  Diet and Physical Activity  · Diet/Nutrition: well balanced diet  · Exercise: walking  Depression Screening  PHQ-2/9 Depression Screening         General Health  · Sleep: sleeps well  · Hearing: normal - bilateral   · Vision: no vision problems  · Dental: regular dental visits  /GYN Health  · Patient is: postmenopausal  · Last menstrual period:   · Contraceptive method:       Review of Systems:     Review of Systems   Constitutional: Negative for appetite change, chills and fever  HENT: Positive for mouth sores  Negative for ear pain, facial swelling, rhinorrhea, sinus pain, sore throat and trouble swallowing  Eyes: Negative for discharge and redness  Respiratory: Negative for chest tightness, shortness of breath and wheezing  Cardiovascular: Negative for chest pain and palpitations  Gastrointestinal: Negative for abdominal pain, diarrhea, nausea and vomiting  Endocrine: Negative for polyuria  Genitourinary: Negative for dysuria and urgency  Musculoskeletal: Positive for back pain  Negative for arthralgias  + thoracic back pain  + diffuse myalgia  Skin: Negative for rash  Neurological: Negative for dizziness, weakness and headaches  Hematological: Negative for adenopathy  Psychiatric/Behavioral: Negative for behavioral problems, confusion and sleep disturbance  All other systems reviewed and are negative  Past Medical History:     Past Medical History:   Diagnosis Date   • Acute gastritis    • Asthma    • Chronic cough    • Depression    • Disease of thyroid gland     hypo   • Esophageal reflux    • Fatigue    • High cholesterol    • Otitis media    • Pharyngitis    • URI (upper respiratory infection)       Past Surgical History:     Past Surgical History:   Procedure Laterality Date   •  SECTION     • CHOLECYSTECTOMY     • [place] COLONOSCOPY FLX DX W/COLLJ SPEC WHEN PFRMD N/A 2016    Procedure: EGD AND COLONOSCOPY;  Surgeon: Benito Nicole MD;  Location: [facility];   Service: Gastroenterology      Social History:     Social History     Socioeconomic History   • Marital status: /Civil Union     Spouse name: None   • Number of children: None   • Years of education: None   • Highest education level: None   Occupational History   • None   Tobacco Use   • Smoking status: Never     Passive exposure: Past   • Smokeless tobacco: Never   Vaping Use   • Vaping Use: Never used   Substance and Sexual Activity   • Alcohol use: Not Currently   • Drug use: Never   • Sexual activity: Yes     Partners: Male     Birth control/protection: Post-menopausal   Other Topics Concern   • None   Social History Narrative    Daily cola/tea consumption     Social Determinants of Health     Financial Resource Strain: Not on file   Food Insecurity: Not on file   Transportation Needs: Not on file   Physical Activity: Not on file   Stress: Not on file   Social Connections: Not on file   Intimate Partner Violence: Not on file   Housing Stability: Not on file      Family History:     Family History   Problem Relation Age of Onset   • Hypertension Father         essential   • Hypertension Maternal Grandmother         essential   • Hypertension Maternal Grandfather         essential   • Diabetes Paternal Grandmother         mellitus   • Hypertension Paternal Grandmother         essential   • Stroke Paternal Grandmother         syndrome   • Colon cancer Family    • No Known Problems Mother    • No Known Problems Brother    • No Known Problems Son    • No Known Problems Son       Current Medications:     Current Outpatient Medications   Medication Sig Dispense Refill   • albuterol (PROVENTIL HFA,VENTOLIN HFA) 90 mcg/act inhaler Inhale 2 puffs every 6 (six) hours as needed for wheezing 8 g 0   • ALPRAZolam (XANAX) 0 25 mg tablet Take 1 tablet (0 25 mg total) by mouth 3 (three) times a day as needed for anxiety 60 tablet 0   • azelastine (ASTELIN) 0 1 % nasal spray 1 spray into each nostril 2 (two) times a day Use in each nostril as directed     • celecoxib (CeleBREX) 100 mg capsule Take 1 capsule (100 mg total) by mouth 2 (two) times a day 60 capsule 1   • escitalopram (LEXAPRO) 10 mg tablet TAKE 1 AND 1/2 TABLETS BY  MOUTH DAILY 135 tablet 3   • estradiol (ESTRACE) 0 1 mg/g vaginal cream INSERT 1 GRAM VAGINALLY 3  TIMES WEEKLY 42 5 g 3   • levothyroxine 112 mcg tablet TAKE 1 TABLET BY MOUTH DAILY 90 tablet 3   • omega-3-acid ethyl esters (LOVAZA) 1 g capsule Take 1 capsule (1 g total) by mouth 2 (two) times a day 180 capsule 3   • pantoprazole (PROTONIX) 40 mg tablet TAKE 1 TABLET BY MOUTH  DAILY BEFORE BREAKFAST 90 tablet 3   • brompheniramine-pseudoephedrine-DM (Bromfed DM) 30-2-10 MG/5ML syrup Take 5 mL by mouth 4 (four) times a day as needed for congestion, cough or allergies (Patient not taking: Reported on 5/4/2023) 120 mL 0     No current facility-administered medications for this visit  Allergies:      Allergies   Allergen Reactions   • Aspirin "Physical Exam:     /76   Pulse 83   Temp 97 9 °F (36 6 °C)   Ht 5' 4\" (1 626 m)   Wt 91 8 kg (202 lb 6 4 oz)   LMP  (LMP Unknown)   SpO2 97%   BMI 34 74 kg/m²     Physical Exam  Vitals and nursing note reviewed  Constitutional:       General: She is not in acute distress  Appearance: Normal appearance  She is not ill-appearing or diaphoretic  HENT:      Head: Normocephalic and atraumatic  Right Ear: Tympanic membrane, ear canal and external ear normal       Left Ear: Tympanic membrane, ear canal and external ear normal       Nose: Nose normal  No congestion or rhinorrhea  Mouth/Throat:      Mouth: Mucous membranes are moist       Pharynx: Oropharynx is clear  No posterior oropharyngeal erythema  Comments: + scattered vesicular lesion on oral mucosa and tongue  Eyes:      General:         Right eye: No discharge  Left eye: No discharge  Extraocular Movements: Extraocular movements intact  Conjunctiva/sclera: Conjunctivae normal       Pupils: Pupils are equal, round, and reactive to light  Neck:      Vascular: No carotid bruit  Cardiovascular:      Rate and Rhythm: Normal rate and regular rhythm  Pulses: Normal pulses  Heart sounds: Normal heart sounds  No murmur heard  Pulmonary:      Effort: Pulmonary effort is normal  No respiratory distress  Breath sounds: Normal breath sounds  No wheezing or rhonchi  Abdominal:      General: Abdomen is flat  Bowel sounds are normal  There is no distension  Palpations: There is no mass  Tenderness: There is no abdominal tenderness  Musculoskeletal:         General: Tenderness present  No swelling or deformity  Normal range of motion  Cervical back: Normal range of motion and neck supple  No rigidity  Right lower leg: No edema  Left lower leg: No edema        Comments: + thoracic muscle tension and tenderness of T-spine on palpation   Lymphadenopathy:      Cervical: No cervical " adenopathy  Skin:     General: Skin is warm and dry  Capillary Refill: Capillary refill takes less than 2 seconds  Coloration: Skin is not jaundiced  Findings: No bruising, erythema or rash  Neurological:      General: No focal deficit present  Mental Status: She is alert and oriented to person, place, and time  Cranial Nerves: No cranial nerve deficit  Sensory: No sensory deficit  Gait: Gait normal       Deep Tendon Reflexes: Reflexes normal    Psychiatric:         Mood and Affect: Mood normal          Behavior: Behavior normal          Thought Content:  Thought content normal          Judgment: Judgment normal           [address]

## 2023-05-21 DIAGNOSIS — M35.3 PMR (POLYMYALGIA RHEUMATICA) (HCC): ICD-10-CM

## 2023-05-22 RX ORDER — CELECOXIB 100 MG/1
CAPSULE ORAL
Qty: 60 CAPSULE | Refills: 1 | Status: SHIPPED | OUTPATIENT
Start: 2023-05-22

## 2023-09-05 DIAGNOSIS — F41.9 ANXIETY: ICD-10-CM

## 2023-09-06 RX ORDER — ALPRAZOLAM 0.25 MG/1
0.25 TABLET ORAL 3 TIMES DAILY PRN
Qty: 60 TABLET | Refills: 0 | Status: SHIPPED | OUTPATIENT
Start: 2023-09-06 | End: 2023-10-06

## 2023-09-06 NOTE — TELEPHONE ENCOUNTER
1 [id number] 12/14/2021 12/14/2021 ALPRAZolam (Tablet) 60.0 20 0.25 MG NA MICHELLE SINGH [facility]  [employer]  00 / 0

## 2023-09-21 ENCOUNTER — OFFICE VISIT (OUTPATIENT)
Dept: FAMILY MEDICINE CLINIC | Facility: CLINIC | Age: 58
End: 2023-09-21
Payer: COMMERCIAL

## 2023-09-21 VITALS
HEART RATE: 63 BPM | HEIGHT: 64 IN | TEMPERATURE: 97.9 F | WEIGHT: 209 LBS | DIASTOLIC BLOOD PRESSURE: 78 MMHG | BODY MASS INDEX: 35.68 KG/M2 | SYSTOLIC BLOOD PRESSURE: 112 MMHG | OXYGEN SATURATION: 98 %

## 2023-09-21 DIAGNOSIS — M35.3 PMR (POLYMYALGIA RHEUMATICA) (HCC): ICD-10-CM

## 2023-09-21 DIAGNOSIS — T78.40XD ALLERGIC REACTION, SUBSEQUENT ENCOUNTER: Primary | ICD-10-CM

## 2023-09-21 PROCEDURE — 99214 OFFICE O/P EST MOD 30 MIN: CPT | Performed by: FAMILY MEDICINE

## 2023-09-21 RX ORDER — PREDNISONE 10 MG/1
TABLET ORAL
Qty: 26 TABLET | Refills: 0 | Status: SHIPPED | OUTPATIENT
Start: 2023-09-21

## 2023-09-21 RX ORDER — MELOXICAM 7.5 MG/1
7.5 TABLET ORAL 2 TIMES DAILY
Qty: 60 TABLET | Refills: 3 | Status: SHIPPED | OUTPATIENT
Start: 2023-09-21

## 2023-09-22 NOTE — PROGRESS NOTES
Patient ID: Edgar Huffman is a 63 y.o. female. HPI: 63 y. o.female presents for evaluation of an allergic reaction. The patient has been experiencing hives, facial swelling of lips eyelids and nasolabial folds on several occasions after taking Celebrex. She also has an aspirin allergy and needs to be on medication to control her PMR. We spoke at length and decided to treat the reaction and then give her a trial of meloxicam since its a [employer] 1 inhibitor and does not have a sulfa association. She will then be referred to an allergist for evaluation of her Celebrex allergy and aspirin allergy.     SUBJECTIVE    Family History   Problem Relation Age of Onset   • Hypertension Father         essential   • Hypertension Maternal Grandmother         essential   • Hypertension Maternal Grandfather         essential   • Diabetes Paternal Grandmother         mellitus   • Hypertension Paternal Grandmother         essential   • Stroke Paternal Grandmother         syndrome   • Colon cancer Family    • No Known Problems Mother    • No Known Problems Brother    • No Known Problems Son    • No Known Problems Son      Social History     Socioeconomic History   • Marital status: /Civil Union     Spouse name: Not on file   • Number of children: Not on file   • Years of education: Not on file   • Highest education level: Not on file   Occupational History   • Not on file   Tobacco Use   • Smoking status: Never     Passive exposure: Past   • Smokeless tobacco: Never   Vaping Use   • Vaping Use: Never used   Substance and Sexual Activity   • Alcohol use: Not Currently   • Drug use: Never   • Sexual activity: Yes     Partners: Male     Birth control/protection: Post-menopausal   Other Topics Concern   • Not on file   Social History Narrative    Daily cola/tea consumption     Social Determinants of Health     Financial Resource Strain: Not on file   Food Insecurity: Not on file   Transportation Needs: Not on file   Physical Activity: Not on file   Stress: Not on file   Social Connections: Not on file   Intimate Partner Violence: Not on file   Housing Stability: Not on file     Past Medical History:   Diagnosis Date   • Acute gastritis    • Asthma    • Chronic cough    • Depression    • Disease of thyroid gland     hypo   • Esophageal reflux    • Fatigue    • High cholesterol    • Otitis media    • Pharyngitis    • URI (upper respiratory infection)      Past Surgical History:   Procedure Laterality Date   •  SECTION     • CHOLECYSTECTOMY     • [place] COLONOSCOPY FLX DX W/COLLJ SPEC WHEN PFRMD N/A 2016    Procedure: EGD AND COLONOSCOPY;  Surgeon: Branden Steven MD;  [facility];   Service: Gastroenterology     Allergies   Allergen Reactions   • Aspirin        Current Outpatient Medications:   •  albuterol (PROVENTIL HFA,VENTOLIN HFA) 90 mcg/act inhaler, Inhale 2 puffs every 6 (six) hours as needed for wheezing, Disp: 8 g, Rfl: 0  •  ALPRAZolam (XANAX) 0.25 mg tablet, Take 1 tablet (0.25 mg total) by mouth 3 (three) times a day as needed for anxiety, Disp: 60 tablet, Rfl: 0  •  azelastine (ASTELIN) 0.1 % nasal spray, 1 spray into each nostril 2 (two) times a day Use in each nostril as directed, Disp: , Rfl:   •  escitalopram (LEXAPRO) 10 mg tablet, TAKE 1 AND 1/2 TABLETS BY  MOUTH DAILY, Disp: 135 tablet, Rfl: 3  •  estradiol (ESTRACE) 0.1 mg/g vaginal cream, INSERT 1 GRAM VAGINALLY 3  TIMES WEEKLY, Disp: 42.5 g, Rfl: 3  •  levothyroxine 112 mcg tablet, TAKE 1 TABLET BY MOUTH DAILY, Disp: 90 tablet, Rfl: 3  •  meloxicam (MOBIC) 7.5 mg tablet, Take 1 tablet (7.5 mg total) by mouth 2 (two) times a day, Disp: 60 tablet, Rfl: 3  •  omega-3-acid ethyl esters (LOVAZA) 1 g capsule, Take 1 capsule (1 g total) by mouth 2 (two) times a day, Disp: 180 capsule, Rfl: 3  •  pantoprazole (PROTONIX) 40 mg tablet, TAKE 1 TABLET BY MOUTH  DAILY BEFORE BREAKFAST, Disp: 90 tablet, Rfl: 3  •  predniSONE 10 mg tablet, 3 tabs po bid x2 days, then 2 tabs po bid x2 days, then 1 tab bid x2 days, then 1 daily until done., Disp: 26 tablet, Rfl: 0  •  valACYclovir (VALTREX) 1,000 mg tablet, Take 1 tablet (1,000 mg total) by mouth 3 (three) times a day for 7 days (Patient not taking: Reported on 9/21/2023), Disp: 21 tablet, Rfl: 0    Review of Systems  Constitutional:     Denies fever, chills ,fatigue ,weakness ,weight loss, weight gain     ENT: Denies earache ,loss of hearing ,nosebleed, nasal discharge,nasal congestion ,sore throat ,hoarseness positive swelling intermittently of nasolabial fold, upper and lower eyelid swelling  Pulmonary: Denies shortness of breath ,cough  ,dyspnea on exertion, orthopnea  ,PND   Cardiovascular:  Denies bradycardia , tachycardia  ,palpations, lower extremity edema leg, claudication  Breast:  Denies new or changing breast lumps ,nipple discharge ,nipple changes  Abdomen:  Denies abdominal pain , anorexia , indigestion, nausea, vomiting, constipation, diarrhea  Musculoskeletal: Denies myalgias, arthralgias, joint swelling, joint stiffness , limb pain, limb swelling  Gu: denies dysuria, polyuria  Skin: Denies skin rash, skin lesion, skin wound, itching, dry skin+hives  Neuro: Denies headache, numbness, tingling, confusion, loss of consciousness, dizziness, vertigo  Psychiatric: Denies feelings of depression, suicidal ideation, anxiety, sleep disturbances    OBJECTIVE  /78   Pulse 63   Temp 97.9 °F (36.6 °C)   Ht 5' 4" (1.626 m)   Wt 94.8 kg (209 lb)   LMP  (LMP Unknown)   SpO2 98%   BMI 35.87 kg/m²   Constitutional:   NAD, well appearing and well nourished      ENT:   Conjunctiva and lids: no injection, edema, or discharge     Pupils and iris: SANTI bilaterally    External inspection of ears and nose: normal without deformities or discharge. Otoscopic exam: Canals patent without erythema.        Nasal mucosa, septum and turbinates: Normal or edema or discharge         Oropharynx:  Moist mucosa, normal tongue and tonsils without lesions. No erythema        Pulmonary:Respiratory effort normal rate and rhythm, no increased work of breathing. Auscultation of lungs:  Clear bilaterally with no adventitious breath sounds       Cardiovascular: regular rate and rhythm, S1 and S2, no murmur, no edema and/or varicosities of LE      Abdomen: Soft and non-distended     Positive bowel sounds      No heptomegaly or splenomegaly      Gu: no suprapubic tenderness or CVA tenderness, no urethral discharge  Lymphatic:  No anterior or posterior cervical lymphadenopathy         Musculoskeletal:  Gait and station: Normal gait      Digits and nails normal without clubbing or cyanosis       Inspection/palpation of joints, bones, and muscles:  No joint tenderness, swelling, full active and passive range of motion       Skin: Normal skin turgor and no rashes      Neuro:       Normal reflexes     Psych:   alert and oriented to person, place and time     normal mood and affect       Assessment/Plan:Diagnoses and all orders for this visit:    Allergic reaction, subsequent encounter  Comments:  Once patient is off prednisone he may find an agent that patient is doing well with will refer to allergist for further testing of aspirin and Celebrex. Orders:  -     predniSONE 10 mg tablet; 3 tabs po bid x2 days, then 2 tabs po bid x2 days, then 1 tab bid x2 days, then 1 daily until done. PMR (polymyalgia rheumatica) ([facility])  -     meloxicam (MOBIC) 7.5 mg tablet; Take 1 tablet (7.5 mg total) by mouth 2 (two) times a day        Reviewed with patient plan to treat with above plan.     Patient instructed to call in 72 hours if not feeling better or if symptoms worsen

## 2023-10-24 DIAGNOSIS — K21.9 LARYNGOPHARYNGEAL REFLUX (LPR): ICD-10-CM

## 2023-10-25 RX ORDER — PANTOPRAZOLE SODIUM 40 MG/1
TABLET, DELAYED RELEASE ORAL
Qty: 90 TABLET | Refills: 1 | Status: SHIPPED | OUTPATIENT
Start: 2023-10-25

## 2023-11-01 ENCOUNTER — RA CDI HCC (OUTPATIENT)
Dept: OTHER | Facility: HOSPITAL | Age: 58
End: 2023-11-01

## 2023-11-08 ENCOUNTER — OFFICE VISIT (OUTPATIENT)
Dept: FAMILY MEDICINE CLINIC | Facility: CLINIC | Age: 58
End: 2023-11-08
Payer: COMMERCIAL

## 2023-11-08 VITALS
OXYGEN SATURATION: 97 % | WEIGHT: 210 LBS | BODY MASS INDEX: 35.85 KG/M2 | HEART RATE: 80 BPM | SYSTOLIC BLOOD PRESSURE: 108 MMHG | HEIGHT: 64 IN | TEMPERATURE: 97.8 F | DIASTOLIC BLOOD PRESSURE: 78 MMHG

## 2023-11-08 DIAGNOSIS — L50.8 CHRONIC URTICARIA: Primary | ICD-10-CM

## 2023-11-08 DIAGNOSIS — F41.9 ANXIETY: ICD-10-CM

## 2023-11-08 DIAGNOSIS — F32.A DEPRESSION, UNSPECIFIED DEPRESSION TYPE: ICD-10-CM

## 2023-11-08 DIAGNOSIS — Z23 ENCOUNTER FOR IMMUNIZATION: ICD-10-CM

## 2023-11-08 PROCEDURE — 90686 IIV4 VACC NO PRSV 0.5 ML IM: CPT | Performed by: FAMILY MEDICINE

## 2023-11-08 PROCEDURE — 99214 OFFICE O/P EST MOD 30 MIN: CPT | Performed by: FAMILY MEDICINE

## 2023-11-08 PROCEDURE — 90471 IMMUNIZATION ADMIN: CPT | Performed by: FAMILY MEDICINE

## 2023-11-10 RX ORDER — ESCITALOPRAM OXALATE 10 MG/1
20 TABLET ORAL DAILY
Qty: 135 TABLET | Refills: 3
Start: 2023-11-10

## 2023-11-20 DIAGNOSIS — F32.A DEPRESSION, UNSPECIFIED DEPRESSION TYPE: ICD-10-CM

## 2023-11-20 RX ORDER — ESCITALOPRAM OXALATE 10 MG/1
15 TABLET ORAL DAILY
Qty: 135 TABLET | Refills: 1 | Status: SHIPPED | OUTPATIENT
Start: 2023-11-20

## 2024-01-23 DIAGNOSIS — M35.3 PMR (POLYMYALGIA RHEUMATICA) (HCC): Primary | ICD-10-CM

## 2024-01-23 RX ORDER — CELECOXIB 100 MG/1
100 CAPSULE ORAL 2 TIMES DAILY
Qty: 180 CAPSULE | Refills: 2 | Status: SHIPPED | OUTPATIENT
Start: 2024-01-23 | End: 2024-04-22

## 2024-01-27 DIAGNOSIS — E03.9 HYPOTHYROIDISM, UNSPECIFIED TYPE: ICD-10-CM

## 2024-01-27 DIAGNOSIS — N95.2 POSTMENOPAUSAL ATROPHIC VAGINITIS: ICD-10-CM

## 2024-01-29 RX ORDER — ESTRADIOL 0.1 MG/G
CREAM VAGINAL
Qty: 42.5 G | Refills: 3 | Status: SHIPPED | OUTPATIENT
Start: 2024-01-29

## 2024-01-29 RX ORDER — LEVOTHYROXINE SODIUM 112 UG/1
TABLET ORAL
Qty: 90 TABLET | Refills: 0 | Status: SHIPPED | OUTPATIENT
Start: 2024-01-29

## 2024-04-04 DIAGNOSIS — E03.9 HYPOTHYROIDISM, UNSPECIFIED TYPE: ICD-10-CM

## 2024-04-09 RX ORDER — LEVOTHYROXINE SODIUM 112 UG/1
TABLET ORAL
Qty: 90 TABLET | Refills: 3 | Status: SHIPPED | OUTPATIENT
Start: 2024-04-09

## 2024-04-21 DIAGNOSIS — F32.A DEPRESSION, UNSPECIFIED DEPRESSION TYPE: ICD-10-CM

## 2024-04-22 RX ORDER — ESCITALOPRAM OXALATE 10 MG/1
15 TABLET ORAL DAILY
Qty: 135 TABLET | Refills: 1 | Status: SHIPPED | OUTPATIENT
Start: 2024-04-22

## 2024-04-23 LAB
ALBUMIN SERPL-MCNC: 4.1 G/DL (ref 3.6–5.1)
ALBUMIN/GLOB SERPL: 1.4 (CALC) (ref 1–2.5)
ALP SERPL-CCNC: 81 U/L (ref 37–153)
ALT SERPL-CCNC: 15 U/L (ref 6–29)
AST SERPL-CCNC: 18 U/L (ref 10–35)
BILIRUB SERPL-MCNC: 0.5 MG/DL (ref 0.2–1.2)
BUN SERPL-MCNC: 15 MG/DL (ref 7–25)
BUN/CREAT SERPL: NORMAL (CALC) (ref 6–22)
CALCIUM SERPL-MCNC: 9.7 MG/DL (ref 8.6–10.4)
CHLORIDE SERPL-SCNC: 101 MMOL/L (ref 98–110)
CHOLEST SERPL-MCNC: 236 MG/DL
CHOLEST/HDLC SERPL: 4.6 (CALC)
CO2 SERPL-SCNC: 31 MMOL/L (ref 20–32)
CREAT SERPL-MCNC: 0.86 MG/DL (ref 0.5–1.03)
CRP SERPL-MCNC: 8.4 MG/L
GFR/BSA.PRED SERPLBLD CYS-BASED-ARV: 78 ML/MIN/1.73M2
GLOBULIN SER CALC-MCNC: 3 G/DL (CALC) (ref 1.9–3.7)
GLUCOSE SERPL-MCNC: 94 MG/DL (ref 65–99)
HDLC SERPL-MCNC: 51 MG/DL
LDLC SERPL CALC-MCNC: 158 MG/DL (CALC)
NONHDLC SERPL-MCNC: 185 MG/DL (CALC)
POTASSIUM SERPL-SCNC: 4.8 MMOL/L (ref 3.5–5.3)
PROT SERPL-MCNC: 7.1 G/DL (ref 6.1–8.1)
SODIUM SERPL-SCNC: 138 MMOL/L (ref 135–146)
TRIGL SERPL-MCNC: 143 MG/DL
TSH SERPL-ACNC: 1.4 MIU/L (ref 0.4–4.5)

## 2024-04-24 ENCOUNTER — TELEPHONE (OUTPATIENT)
Dept: FAMILY MEDICINE CLINIC | Facility: CLINIC | Age: 59
End: 2024-04-24

## 2024-04-24 DIAGNOSIS — R79.82 ELEVATED C-REACTIVE PROTEIN (CRP): Primary | ICD-10-CM

## 2024-04-24 DIAGNOSIS — Z00.00 ANNUAL PHYSICAL EXAM: ICD-10-CM

## 2024-04-24 DIAGNOSIS — E03.9 HYPOTHYROIDISM, UNSPECIFIED TYPE: ICD-10-CM

## 2024-04-24 NOTE — TELEPHONE ENCOUNTER
Patient advised of results.Patient stated she will discuss taking the fish oil capsules  when she comes in for her appt.

## 2024-04-24 NOTE — TELEPHONE ENCOUNTER
----- Message from Raiza Campos DO sent at 4/24/2024  9:39 AM EDT -----  Her marker for inflammation is slightly elevated at 8.4.  8.0 is normal.  Her cholesterol ration is the same as last year at 4.6..If she would like to try to take this down further, she could try two fish oil capsules daily.

## 2024-05-07 LAB — CRP SERPL-MCNC: 6.9 MG/L

## 2024-05-08 ENCOUNTER — OFFICE VISIT (OUTPATIENT)
Dept: FAMILY MEDICINE CLINIC | Facility: CLINIC | Age: 59
End: 2024-05-08
Payer: COMMERCIAL

## 2024-05-08 VITALS
HEART RATE: 80 BPM | HEIGHT: 64 IN | DIASTOLIC BLOOD PRESSURE: 72 MMHG | TEMPERATURE: 97.2 F | SYSTOLIC BLOOD PRESSURE: 112 MMHG | BODY MASS INDEX: 36.33 KG/M2 | OXYGEN SATURATION: 98 % | WEIGHT: 212.8 LBS

## 2024-05-08 DIAGNOSIS — Z82.49 FAMILY HISTORY OF HEART DISEASE: ICD-10-CM

## 2024-05-08 DIAGNOSIS — E03.9 HYPOTHYROIDISM, UNSPECIFIED TYPE: ICD-10-CM

## 2024-05-08 DIAGNOSIS — L20.9 ATOPIC DERMATITIS, UNSPECIFIED TYPE: ICD-10-CM

## 2024-05-08 DIAGNOSIS — F32.A DEPRESSION, UNSPECIFIED DEPRESSION TYPE: ICD-10-CM

## 2024-05-08 DIAGNOSIS — Z00.00 ANNUAL PHYSICAL EXAM: Primary | ICD-10-CM

## 2024-05-08 PROCEDURE — G0439 PPPS, SUBSEQ VISIT: HCPCS | Performed by: FAMILY MEDICINE

## 2024-05-08 PROCEDURE — 99214 OFFICE O/P EST MOD 30 MIN: CPT | Performed by: FAMILY MEDICINE

## 2024-05-08 RX ORDER — PREDNISONE 10 MG/1
TABLET ORAL
Qty: 26 TABLET | Refills: 0 | Status: SHIPPED | OUTPATIENT
Start: 2024-05-08

## 2024-05-08 NOTE — PROGRESS NOTES
ADULT ANNUAL PHYSICAL  [facility]IC[facility]    NAME: Palma Green  AGE: 59 y.o. SEX: female  : 1965     DATE: 2024     Assessment and Plan:     Problem List Items Addressed This Visit    None      Immunizations and preventive care screenings were discussed with patient today. Appropriate education was printed on patient's after visit summary.    Counseling:  Exercise: the importance of regular exercise/physical activity was discussed. Recommend exercise 3-5 times per week for at least 30 minutes.          No follow-ups on file.     Chief Complaint:     Chief Complaint   Patient presents with    Annual Exam      History of Present Illness:     Adult Annual Physical   Patient here for a comprehensive physical exam. The patient reports no problems.    Diet and Physical Activity  Diet/Nutrition: well balanced diet.   Exercise: no formal exercise.      Depression Screening  PHQ-2/9 Depression Screening           General Health  Sleep: sleeps well.   Hearing: normal - bilateral.  Vision: no vision problems.   Dental: regular dental visits.       /GYN Health  Follows with gynecology? no   Patient is: postmenopausal  Last menstrual period:   Contraceptive method: .    Advanced Care Planning  Do you have an advanced directive? no  Do you have a durable medical power of ? no  ACP document given to the patient? yes     Review of Systems:     Review of Systems   Constitutional:  Negative for appetite change, chills and fever.   HENT:  Negative for ear pain, facial swelling, rhinorrhea, sinus pain, sore throat and trouble swallowing.    Eyes:  Negative for discharge and redness.   Respiratory:  Negative for chest tightness, shortness of breath and wheezing.    Cardiovascular:  Negative for chest pain and palpitations.   Gastrointestinal:  Negative for abdominal pain, diarrhea, nausea and vomiting.   Endocrine: Negative for polyuria.   Genitourinary:   Negative for dysuria and urgency.   Musculoskeletal:  Negative for arthralgias and back pain.   Skin:  Positive for rash.        Rash on both thighs nonpruritic in nature slightly red   Neurological:  Negative for dizziness, weakness and headaches.   Hematological:  Negative for adenopathy.   Psychiatric/Behavioral:  Negative for behavioral problems, confusion and sleep disturbance.    All other systems reviewed and are negative.     Past Medical History:     Past Medical History:   Diagnosis Date    Acute gastritis     Asthma     Chronic cough     Depression     Disease of thyroid gland     hypo    Esophageal reflux     Fatigue     High cholesterol     Otitis media     Pharyngitis     URI (upper respiratory infection)       Past Surgical History:     Past Surgical History:   Procedure Laterality Date     SECTION      CHOLECYSTECTOMY      NE COLONOSCOPY FLX DX W/COLLJ SPEC WHEN PFRMD N/A 2016    Procedure: EGD AND COLONOSCOPY;  Surgeon: Luis Morrow MD;  Location: [facility];  Service: Gastroenterology      Social History:     Social History     Socioeconomic History    Marital status: /Civil Union     Spouse name: Not on file    Number of children: Not on file    Years of education: Not on file    Highest education level: Not on file   Occupational History    Not on file   Tobacco Use    Smoking status: Never     Passive exposure: Past    Smokeless tobacco: Never   Vaping Use    Vaping status: Never Used   Substance and Sexual Activity    Alcohol use: Not Currently    Drug use: Never    Sexual activity: Yes     Partners: Male     Birth control/protection: Post-menopausal   Other Topics Concern    Not on file   Social History Narrative    Daily cola/tea consumption     Social Determinants of Health     Financial Resource Strain: Not on file   Food Insecurity: Not on file   Transportation Needs: Not on file   Physical Activity: Not on file   Stress: Not on file   Social Connections: Not on file    Intimate Partner Violence: Not on file   Housing Stability: Not on file      Family History:     Family History   Problem Relation Age of Onset    Hypertension Father         essential    Hypertension Maternal Grandmother         essential    Hypertension Maternal Grandfather         essential    Diabetes Paternal Grandmother         mellitus    Hypertension Paternal Grandmother         essential    Stroke Paternal Grandmother         syndrome    Colon cancer Family     No Known Problems Mother     No Known Problems Brother     No Known Problems Son     No Known Problems Son       Current Medications:     Current Outpatient Medications   Medication Sig Dispense Refill    albuterol (PROVENTIL HFA,VENTOLIN HFA) 90 mcg/act inhaler Inhale 2 puffs every 6 (six) hours as needed for wheezing 8 g 0    ALPRAZolam (XANAX) 0.25 mg tablet Take 1 tablet (0.25 mg total) by mouth 3 (three) times a day as needed for anxiety 60 tablet 0    azelastine (ASTELIN) 0.1 % nasal spray 1 spray into each nostril 2 (two) times a day Use in each nostril as directed      celecoxib (CeleBREX) 100 mg capsule Take 1 capsule (100 mg total) by mouth 2 (two) times a day 180 capsule 2    escitalopram (LEXAPRO) 10 mg tablet TAKE 1 AND 1/2 TABLETS BY MOUTH  DAILY 135 tablet 1    estradiol (ESTRACE) 0.1 mg/g vaginal cream INSERT 1 GRAM VAGINALLY 3 TIMES  WEEKLY 42.5 g 3    levothyroxine 112 mcg tablet TAKE 1 TABLET BY MOUTH DAILY 90 tablet 3     No current facility-administered medications for this visit.      Allergies:     Allergies   Allergen Reactions    Aspirin       Physical Exam:     LMP  (LMP Unknown)     Physical Exam  Vitals and nursing note reviewed.   Constitutional:       General: She is not in acute distress.     Appearance: Normal appearance. She is not ill-appearing or diaphoretic.   HENT:      Head: Normocephalic and atraumatic.      Right Ear: Tympanic membrane, ear canal and external ear normal.      Left Ear: Tympanic membrane, ear  canal and external ear normal.      Nose: Nose normal. No congestion or rhinorrhea.      Mouth/Throat:      Mouth: Mucous membranes are moist.      Pharynx: Oropharynx is clear. No posterior oropharyngeal erythema.   Eyes:      General:         Right eye: No discharge.         Left eye: No discharge.      Extraocular Movements: Extraocular movements intact.      Conjunctiva/sclera: Conjunctivae normal.      Pupils: Pupils are equal, round, and reactive to light.   Neck:      Vascular: No carotid bruit.   Cardiovascular:      Rate and Rhythm: Normal rate and regular rhythm.      Pulses: Normal pulses.      Heart sounds: Normal heart sounds. No murmur heard.  Pulmonary:      Effort: Pulmonary effort is normal. No respiratory distress.      Breath sounds: Normal breath sounds. No wheezing or rhonchi.   Abdominal:      General: Abdomen is flat. Bowel sounds are normal. There is no distension.      Palpations: There is no mass.      Tenderness: There is no abdominal tenderness.   Musculoskeletal:         General: No swelling or deformity. Normal range of motion.      Cervical back: Normal range of motion and neck supple. No rigidity.      Right lower leg: No edema.      Left lower leg: No edema.   Lymphadenopathy:      Cervical: No cervical adenopathy.   Skin:     General: Skin is warm and dry.      Capillary Refill: Capillary refill takes less than 2 seconds.      Coloration: Skin is not jaundiced.      Findings: Rash present. No bruising or erythema.      Comments: Slight erythematous rash on both thighs macular in nature   Neurological:      General: No focal deficit present.      Mental Status: She is alert and oriented to person, place, and time.      Cranial Nerves: No cranial nerve deficit.      Sensory: No sensory deficit.      Gait: Gait normal.      Deep Tendon Reflexes: Reflexes normal.   Psychiatric:         Mood and Affect: Mood normal.         Behavior: Behavior normal.         Thought Content: Thought  content normal.         Judgment: Judgment normal.          Etta Suh MA  [facility]

## 2024-05-27 NOTE — PROGRESS NOTES
Patient ID: Ori Simmons is a 63 y.o. female. HPI: 63 y. o.female presents for evaluation of chronic urticaria. She has been under increased anxiety and stress recently and feels that this is the trigger for her hives. We discussed doing histamine 1 and 2 blockade with an antihistamine and over-the-counter Pepcid and increasing her Lexapro in an effort to control her anxiety. She is also changing jobs in approximately 3 weeks time and feels like that will be helpful as well.     SUBJECTIVE    Family History   Problem Relation Age of Onset    Hypertension Father         essential    Hypertension Maternal Grandmother         essential    Hypertension Maternal Grandfather         essential    Diabetes Paternal Grandmother         mellitus    Hypertension Paternal Grandmother         essential    Stroke Paternal Grandmother         syndrome    Colon cancer Family     No Known Problems Mother     No Known Problems Brother     No Known Problems Son     No Known Problems Son      Social History     Socioeconomic History    Marital status: /Civil Union     Spouse name: Not on file    Number of children: Not on file    Years of education: Not on file    Highest education level: Not on file   Occupational History    Not on file   Tobacco Use    Smoking status: Never     Passive exposure: Past    Smokeless tobacco: Never   Vaping Use    Vaping Use: Never used   Substance and Sexual Activity    Alcohol use: Not Currently    Drug use: Never    Sexual activity: Yes     Partners: Male     Birth control/protection: Post-menopausal   Other Topics Concern    Not on file   Social History Narrative    Daily cola/tea consumption     Social Determinants of Health     Financial Resource Strain: Not on file   Food Insecurity: Not on file   Transportation Needs: Not on file   Physical Activity: Not on file   Stress: Not on file   Social Connections: Not on file   Intimate Partner Violence: Not on file   Housing Stability: Not on file     Past Medical History:   Diagnosis Date    Acute gastritis     Asthma     Chronic cough     Depression     Disease of thyroid gland     hypo    Esophageal reflux     Fatigue     High cholesterol     Otitis media     Pharyngitis     URI (upper respiratory infection)      Past Surgical History:   Procedure Laterality Date     SECTION      CHOLECYSTECTOMY      TX COLONOSCOPY FLX DX W/COLLJ SPEC WHEN PFRMD N/A 2016    Procedure: EGD AND COLONOSCOPY;  Surgeon: Que Whitmore MD;  Location[facility];   Service: Gastroenterology     Allergies   Allergen Reactions    Aspirin        Current Outpatient Medications:     albuterol (PROVENTIL HFA,VENTOLIN HFA) 90 mcg/act inhaler, Inhale 2 puffs every 6 (six) hours as needed for wheezing, Disp: 8 g, Rfl: 0    ALPRAZolam (XANAX) 0.25 mg tablet, Take 1 tablet (0.25 mg total) by mouth 3 (three) times a day as needed for anxiety, Disp: 60 tablet, Rfl: 0    azelastine (ASTELIN) 0.1 % nasal spray, 1 spray into each nostril 2 (two) times a day Use in each nostril as directed, Disp: , Rfl:     escitalopram (LEXAPRO) 10 mg tablet, TAKE 1 AND 1/2 TABLETS BY  MOUTH DAILY, Disp: 135 tablet, Rfl: 3    estradiol (ESTRACE) 0.1 mg/g vaginal cream, INSERT 1 GRAM VAGINALLY 3  TIMES WEEKLY, Disp: 42.5 g, Rfl: 3    levothyroxine 112 mcg tablet, TAKE 1 TABLET BY MOUTH DAILY, Disp: 90 tablet, Rfl: 3    Review of Systems  Constitutional:     Denies fever, chills ,fatigue ,weakness ,weight loss, weight gain     ENT: Denies earache ,loss of hearing ,nosebleed, nasal discharge,nasal congestion ,sore throat ,hoarseness  Pulmonary: Denies shortness of breath ,cough  ,dyspnea on exertion, orthopnea  ,PND   Cardiovascular:  Denies bradycardia , tachycardia  ,palpations, lower extremity edema leg, claudication  Breast:  Denies new or changing breast lumps ,nipple discharge ,nipple changes  Abdomen:  Denies abdominal pain , anorexia , indigestion, nausea, vomiting, constipation, diarrhea  Musculoskeletal: Denies myalgias, arthralgias, joint swelling, joint stiffness , limb pain, limb swelling  Gu: denies dysuria, polyuria  Skin: Denies skin rash, skin lesion, skin wound, itching, dry skin+ chronic uriticaria  Neuro: Denies headache, numbness, tingling, confusion, loss of consciousness, dizziness, vertigo  Psychiatric: Denies feelings of depression, suicidal ideation, +anxiety, no sleep disturbances    OBJECTIVE  /78   Pulse 80   Temp 97.8 °F (36.6 °C)   Ht 5' 4" (1.626 m)   Wt 95.3 kg (210 lb)   LMP  (LMP Unknown)   SpO2 97%   BMI 36.05 kg/m²   Constitutional:   NAD, well appearing and well nourished      ENT:   Conjunctiva and lids: no injection, edema, or discharge     Pupils and iris: SANTI bilaterally    External inspection of ears and nose: normal without deformities or discharge. Otoscopic exam: Canals patent without erythema. Nasal mucosa, septum and turbinates: Normal or edema or discharge         Oropharynx:  Moist mucosa, normal tongue and tonsils without lesions. No erythema        Pulmonary:Respiratory effort normal rate and rhythm, no increased work of breathing.  Auscultation of lungs:  Clear bilaterally with no adventitious breath sounds       Cardiovascular: regular rate and rhythm, S1 and S2, no murmur, no edema and/or varicosities of LE      Abdomen: Soft and non-distended     Positive bowel sounds      No heptomegaly or splenomegaly      Gu: no suprapubic tenderness or CVA tenderness, no urethral discharge  Lymphatic:  No anterior or posterior cervical lymphadenopathy         Musculoskeletal:  Gait and station: Normal gait      Digits and nails normal without clubbing or cyanosis       Inspection/palpation of joints, bones, and muscles:  No joint tenderness, swelling, full active and passive range of motion       Skin: Normal skin turgor and diffuse urticaria on arms abdomen and neck positive dermatographia     Neuro:      Normal reflexes     Psych: alert and oriented to person, place and time     Anxious mood and affect       Assessment/Plan:Diagnoses and all orders for this visit:    Chronic urticaria  Comments:  Patient to take Pepcid 10 mg daily and Zyrtec 10 mg daily to block histamine 1 and to release. Anxiety  Comments:  Increase Lexapro to 20 mg daily and call in 10 days time with an update. Depression, unspecified depression type  Comments:  Plan as above. Orders:  -     escitalopram (LEXAPRO) 10 mg tablet; Take 2 tablets (20 mg total) by mouth daily    Encounter for immunization  -     influenza vaccine, quadrivalent, 0.5 mL, preservative-free, for adult and pediatric patients 6 mos+ (AFLURIA, FLUARIX, FLULAVAL, FLUZONE)        Reviewed with patient plan to treat with above plan.     Patient instructed to call in 72 hours if not feeling better or if symptoms worsen ADMIT

## 2024-06-18 ENCOUNTER — E-CONSULT (OUTPATIENT)
Dept: RHEUMATOLOGY | Facility: CLINIC | Age: 59
End: 2024-06-18
Payer: COMMERCIAL

## 2024-06-18 DIAGNOSIS — M35.3 PMR (POLYMYALGIA RHEUMATICA) (HCC): Primary | ICD-10-CM

## 2024-06-18 DIAGNOSIS — Z79.52 ON PREDNISONE THERAPY: Primary | ICD-10-CM

## 2024-06-18 PROCEDURE — 99446 NTRPROF PH1/NTRNET/EHR 5-10: CPT | Performed by: PHYSICIAN ASSISTANT

## 2024-06-18 NOTE — PROGRESS NOTES
E-Consult  Palma Green 59 y.o. female MRN: [id number]  Encounter Date: 06/18/24      Reason for Consult / Principal Problem:      Pt with PMR; prednisone works, but failed celebrex and meloxicam to keep symptoms from reoccuring.  An suggestions?       Consulting Provider: Juanjo Marie PA-C    Requesting Provider: Aarti Baez*       ASSESSMENT:  Ms. Green is a 59-year-old female with past medical history significant for chronic urticaria, asthma, anxiety, acid reflux, depression, hypothyroidism, left retinal detachment, and obesity who is following with the primary care team.  It was suspected that the patient has polymyalgia rheumatica and responded to prednisone.  She does not respond to Celebrex or meloxicam.    Per chart review, autoimmune labs were drawn in May 2022 and found to be unremarkable in terms of SANJEEV, Sjogren's antibodies, CK, Lyme, parvo B19 IgG.  Most recently, CRP was found to be 8.4 in April 2024 and 6.9 in May 2024.    RECOMMENDATIONS:  There does not appear to be any documentation in the chart regarding patient presenting with symptoms of pain/stiffness of the shoulder or hip girdle, headaches, fevers, scalp tenderness, temporal tenderness, jaw claudication, etc.  Inflammation markers have not been found to be significantly elevated over the last few months (even prior to prednisone taper) and therefore the likelihood of this patient having polymyalgia rheumatica will be extremely low in this 59 year old female.  Depending on what her symptoms are, would recommend investigating for alternate etiology.    Total time spent 5-10 minutes, >50% of the total time devoted to medical consultative verbal/EMR discussion between providers. Written report will be generated in the EMR. .

## 2024-06-19 ENCOUNTER — PATIENT MESSAGE (OUTPATIENT)
Dept: FAMILY MEDICINE CLINIC | Facility: CLINIC | Age: 59
End: 2024-06-19

## 2024-06-20 DIAGNOSIS — M35.3 PMR (POLYMYALGIA RHEUMATICA) (HCC): Primary | ICD-10-CM

## 2024-07-08 ENCOUNTER — OFFICE VISIT (OUTPATIENT)
Dept: FAMILY MEDICINE CLINIC | Facility: CLINIC | Age: 59
End: 2024-07-08
Payer: COMMERCIAL

## 2024-07-08 VITALS
SYSTOLIC BLOOD PRESSURE: 122 MMHG | DIASTOLIC BLOOD PRESSURE: 84 MMHG | WEIGHT: 213 LBS | BODY MASS INDEX: 36.37 KG/M2 | OXYGEN SATURATION: 96 % | HEIGHT: 64 IN | TEMPERATURE: 97.1 F | HEART RATE: 100 BPM

## 2024-07-08 DIAGNOSIS — J01.10 ACUTE NON-RECURRENT FRONTAL SINUSITIS: Primary | ICD-10-CM

## 2024-07-08 DIAGNOSIS — R68.89 FLU-LIKE SYMPTOMS: ICD-10-CM

## 2024-07-08 PROBLEM — M35.3 PMR (POLYMYALGIA RHEUMATICA) (HCC): Status: ACTIVE | Noted: 2024-07-08

## 2024-07-08 LAB
SARS-COV-2 AG UPPER RESP QL IA: NEGATIVE
VALID CONTROL: NORMAL

## 2024-07-08 PROCEDURE — 87811 SARS-COV-2 COVID19 W/OPTIC: CPT | Performed by: FAMILY MEDICINE

## 2024-07-08 PROCEDURE — 99213 OFFICE O/P EST LOW 20 MIN: CPT | Performed by: FAMILY MEDICINE

## 2024-07-08 RX ORDER — AMOXICILLIN 875 MG/1
875 TABLET, COATED ORAL 2 TIMES DAILY
Qty: 20 TABLET | Refills: 0 | Status: SHIPPED | OUTPATIENT
Start: 2024-07-08 | End: 2024-07-18

## 2024-07-08 RX ORDER — AZELASTINE 1 MG/ML
1 SPRAY, METERED NASAL 2 TIMES DAILY
Qty: 1 ML | Refills: 2 | Status: SHIPPED | OUTPATIENT
Start: 2024-07-08

## 2024-07-08 NOTE — PROGRESS NOTES
Ambulatory Visit  Name: Palma [facility]      : 1965      MRN: [id number]  Encounter Provider: Lauri Winchester MD  Encounter Date: 2024   Encounter department: [facility]    Assessment & Plan   1. Acute non-recurrent frontal sinusitis  -     azelastine (ASTELIN) 0.1 % nasal spray; 1 spray into each nostril 2 (two) times a day Use in each nostril as directed  -     amoxicillin (AMOXIL) 875 mg tablet; Take 1 tablet (875 mg total) by mouth 2 (two) times a day for 10 days  2. Flu-like symptoms  -     POCT Rapid Covid Ag    Discussion:  COVID test neg. Start amox for frontal sinusitis. Continue astelin for rhinorrhea.  Recheck 3-4d if not improving - earlier if worse       History of Present Illness     Pt with 2 week hx of intermittent frontal sinus pain.  1 week ago, pt developed sore throat, chest congestion, and deep cough. Pt felt that she was improving, but this morning symptoms worsened again. Pt denies fever/chills. Home COVID was neg.       Review of Systems   Constitutional:  Positive for fatigue. Negative for chills and fever.   HENT:  Positive for congestion, sinus pressure, sinus pain and sore throat. Negative for ear discharge and ear pain.    Eyes: Negative.    Respiratory:  Positive for cough. Negative for shortness of breath.    Cardiovascular: Negative.      Past Medical History:   Diagnosis Date   • Acute gastritis    • Asthma    • Chronic cough    • Depression    • Disease of thyroid gland     hypo   • Esophageal reflux    • Fatigue    • High cholesterol    • Otitis media    • Pharyngitis    • URI (upper respiratory infection)      Past Surgical History:   Procedure Laterality Date   •  SECTION     • CHOLECYSTECTOMY     • SC COLONOSCOPY FLX DX W/COLLJ SPEC WHEN PFRMD N/A 2016    Procedure: EGD AND COLONOSCOPY;  Surgeon: Luis Morrow MD;  Location[facility];  Service: Gastroenterology     Family History   Problem Relation Age of Onset   •  Hypertension Father         essential   • Hypertension Maternal Grandmother         essential   • Hypertension Maternal Grandfather         essential   • Diabetes Paternal Grandmother         mellitus   • Hypertension Paternal Grandmother         essential   • Stroke Paternal Grandmother         syndrome   • Colon cancer Family    • No Known Problems Mother    • No Known Problems Brother    • No Known Problems Son    • No Known Problems Son      Social History     Tobacco Use   • Smoking status: Never     Passive exposure: Past   • Smokeless tobacco: Never   Vaping Use   • Vaping status: Never Used   Substance and Sexual Activity   • Alcohol use: Not Currently   • Drug use: Never   • Sexual activity: Yes     Partners: Male     Birth control/protection: Post-menopausal     Current Outpatient Medications on File Prior to Visit   Medication Sig   • albuterol (PROVENTIL HFA,VENTOLIN HFA) 90 mcg/act inhaler Inhale 2 puffs every 6 (six) hours as needed for wheezing   • ALPRAZolam (XANAX) 0.25 mg tablet Take 1 tablet (0.25 mg total) by mouth 3 (three) times a day as needed for anxiety   • escitalopram (LEXAPRO) 10 mg tablet TAKE 1 AND 1/2 TABLETS BY MOUTH  DAILY   • estradiol (ESTRACE) 0.1 mg/g vaginal cream INSERT 1 GRAM VAGINALLY 3 TIMES  WEEKLY   • levothyroxine 112 mcg tablet TAKE 1 TABLET BY MOUTH DAILY     Allergies   Allergen Reactions   • Aspirin    • Celebrex [Celecoxib] Angioedema     Immunization History   Administered Date(s) Administered   • COVID-19 MODERNA VACC 0.5 ML IM 01/13/2021, 02/12/2021, 01/04/2022   • INFLUENZA 12/02/2019, 12/01/2022   • Influenza Quadrivalent Preservative Free 3 years and older IM 11/06/2014, 12/21/2016, 12/01/2017   • Influenza, injectable, quadrivalent, preservative free 0.5 mL 11/08/2018, 11/08/2023   • Influenza, recombinant, quadrivalent,injectable, preservative free 10/09/2020   • Influenza, seasonal, injectable, preservative free 12/12/2012, 11/07/2013, 11/05/2015   •  "Pneumococcal Conjugate 13-Valent 10/09/2020   • Pneumococcal Polysaccharide PPV23 07/14/2005     Objective     /84 (BP Location: Left arm, Patient Position: Sitting, Cuff Size: Large)   Pulse 100   Temp (!) 97.1 °F (36.2 °C)   Ht 5' 4\" (1.626 m)   Wt 96.6 kg (213 lb)   LMP  (LMP Unknown)   SpO2 96%   BMI 36.56 kg/m²     Physical Exam  Vitals reviewed.   HENT:      Head: Normocephalic.      Right Ear: Tympanic membrane, ear canal and external ear normal.      Left Ear: Tympanic membrane, ear canal and external ear normal.      Nose: Congestion present.      Comments: Frontal sinuses TTP     Mouth/Throat:      Mouth: Mucous membranes are moist.   Eyes:      Extraocular Movements: Extraocular movements intact.      Conjunctiva/sclera: Conjunctivae normal.      Pupils: Pupils are equal, round, and reactive to light.   Cardiovascular:      Rate and Rhythm: Normal rate.      Pulses: Normal pulses.   Pulmonary:      Effort: Pulmonary effort is normal.      Breath sounds: No wheezing or rales.   Musculoskeletal:      Cervical back: No tenderness.   Lymphadenopathy:      Cervical: No cervical adenopathy.   Skin:     Capillary Refill: Capillary refill takes less than 2 seconds.   Neurological:      Mental Status: She is alert.       Administrative Statements         "

## 2024-09-21 DIAGNOSIS — F32.A DEPRESSION, UNSPECIFIED DEPRESSION TYPE: ICD-10-CM

## 2024-09-23 RX ORDER — ESCITALOPRAM OXALATE 10 MG/1
15 TABLET ORAL DAILY
Qty: 135 TABLET | Refills: 1 | Status: SHIPPED | OUTPATIENT
Start: 2024-09-23

## 2024-09-25 DIAGNOSIS — R05.9 COUGH: ICD-10-CM

## 2024-09-25 RX ORDER — ALBUTEROL SULFATE 90 UG/1
2 INHALANT RESPIRATORY (INHALATION) EVERY 6 HOURS PRN
Qty: 8 G | Refills: 0 | Status: SHIPPED | OUTPATIENT
Start: 2024-09-25

## 2024-09-25 NOTE — TELEPHONE ENCOUNTER
Pt called requesting if inhaler request can be sent today- pt is out of old inhaler and is wheezing/ coughing states she is coming down with a cold and would feel more comfortable with the inhaler.

## 2024-09-26 ENCOUNTER — OFFICE VISIT (OUTPATIENT)
Dept: FAMILY MEDICINE CLINIC | Facility: CLINIC | Age: 59
End: 2024-09-26
Payer: COMMERCIAL

## 2024-09-26 VITALS
BODY MASS INDEX: 36.64 KG/M2 | DIASTOLIC BLOOD PRESSURE: 72 MMHG | SYSTOLIC BLOOD PRESSURE: 104 MMHG | HEART RATE: 111 BPM | WEIGHT: 214.6 LBS | HEIGHT: 64 IN | OXYGEN SATURATION: 95 % | TEMPERATURE: 97.6 F

## 2024-09-26 DIAGNOSIS — R68.89 FLU-LIKE SYMPTOMS: ICD-10-CM

## 2024-09-26 DIAGNOSIS — J45.909 ASTHMATIC BRONCHITIS WITHOUT COMPLICATION, UNSPECIFIED ASTHMA SEVERITY, UNSPECIFIED WHETHER PERSISTENT: Primary | ICD-10-CM

## 2024-09-26 PROCEDURE — 99213 OFFICE O/P EST LOW 20 MIN: CPT | Performed by: FAMILY MEDICINE

## 2024-09-26 RX ORDER — PREDNISONE 5 MG/1
TABLET ORAL
Qty: 26 TABLET | Refills: 0 | Status: SHIPPED | OUTPATIENT
Start: 2024-09-26

## 2024-09-26 RX ORDER — CEFUROXIME AXETIL 500 MG/1
500 TABLET ORAL EVERY 12 HOURS SCHEDULED
Qty: 20 TABLET | Refills: 0 | Status: SHIPPED | OUTPATIENT
Start: 2024-09-26 | End: 2024-10-06

## 2024-10-02 NOTE — PROGRESS NOTES
Patient ID: Palma Green is a 59 y.o. female.    HPI: 59 y.o.female presenting with symptoms of chest congestion, cough and wheezing.  Symptoms for the past 72 hours..      SUBJECTIVE    Family History   Problem Relation Age of Onset    Hypertension Father         essential    Hypertension Maternal Grandmother         essential    Hypertension Maternal Grandfather         essential    Diabetes Paternal Grandmother         mellitus    Hypertension Paternal Grandmother         essential    Stroke Paternal Grandmother         syndrome    Colon cancer Family     No Known Problems Mother     No Known Problems Brother     No Known Problems Son     No Known Problems Son      Social History     Socioeconomic History    Marital status: /Civil Union     Spouse name: Not on file    Number of children: Not on file    Years of education: Not on file    Highest education level: Not on file   Occupational History    Not on file   Tobacco Use    Smoking status: Never     Passive exposure: Past    Smokeless tobacco: Never   Vaping Use    Vaping status: Never Used   Substance and Sexual Activity    Alcohol use: Not Currently    Drug use: Never    Sexual activity: Yes     Partners: Male     Birth control/protection: Post-menopausal   Other Topics Concern    Not on file   Social History Narrative    Daily cola/tea consumption     Social Determinants of Health     Financial Resource Strain: Not on file   Food Insecurity: Not on file   Transportation Needs: Not on file   Physical Activity: Not on file   Stress: Not on file   Social Connections: Not on file   Intimate Partner Violence: Not on file   Housing Stability: Not on file     Past Medical History:   Diagnosis Date    Acute gastritis     Asthma     Chronic cough     Depression     Disease of thyroid gland     hypo    Esophageal reflux     Fatigue     High cholesterol     Otitis media     Pharyngitis     URI (upper respiratory infection)      Past Surgical History:    Procedure Laterality Date     SECTION      CHOLECYSTECTOMY      [place] COLONOSCOPY FLX DX W/COLLJ SPEC WHEN PFRMD N/A 2016    Procedure: EGD AND COLONOSCOPY;  Surgeon: Luis Morrow MD;  Location: [facility];  Service: Gastroenterology     Allergies   Allergen Reactions    Aspirin     Celebrex [Celecoxib] Angioedema       Current Outpatient Medications:     albuterol (PROVENTIL HFA,VENTOLIN HFA) 90 mcg/act inhaler, Inhale 2 puffs every 6 (six) hours as needed for wheezing, Disp: 8 g, Rfl: 0    ALPRAZolam (XANAX) 0.25 mg tablet, Take 1 tablet (0.25 mg total) by mouth 3 (three) times a day as needed for anxiety, Disp: 60 tablet, Rfl: 0    azelastine (ASTELIN) 0.1 % nasal spray, 1 spray into each nostril 2 (two) times a day Use in each nostril as directed, Disp: 1 mL, Rfl: 2    cefuroxime (CEFTIN) 500 mg tablet, Take 1 tablet (500 mg total) by mouth every 12 (twelve) hours for 10 days, Disp: 20 tablet, Rfl: 0    escitalopram (LEXAPRO) 10 mg tablet, TAKE 1 AND 1/2 TABLETS BY MOUTH  DAILY, Disp: 135 tablet, Rfl: 1    estradiol (ESTRACE) 0.1 mg/g vaginal cream, INSERT 1 GRAM VAGINALLY 3 TIMES  WEEKLY, Disp: 42.5 g, Rfl: 3    levothyroxine 112 mcg tablet, TAKE 1 TABLET BY MOUTH DAILY, Disp: 90 tablet, Rfl: 3    predniSONE 5 mg tablet, 3 tabs po bid x2 days, then 2 tabs po bid x2 days, then 1 tab bid x2 days, then 1 daily until done., Disp: 26 tablet, Rfl: 0    Review of Systems    Constitutional:     Denies fever, chills, fatigue, weakness ,weight loss, weight gain     ENT: Denies earache, loss of hearing, nosebleed, nasal discharge,nasal congestion, sore throat,hoarseness  Pulmonary: Denies shortness of breath , dyspnea on exertion, orthopnea ,but complains of cough, wheezing and pnd.  Cardiovascular:  Denies bradycardia , tachycardia ,palpations, lower extremity, edema leg, claudication  Breast:  Denies new or changing breast lumps,  nipple discharge, nipple changes,  Abdomen:  Denies abdominal pain , anorexia  ",indigestion, nausea ,vomiting, constipation , diarrhea  Musculoskeletal: Denies myalgias, arthralgias, joint swelling, joint stiffness ,limb pain, limb swelling  Lymph: denies swollen glands  Gu: no dysuria or urinary frequency  Skin: Denies skin rash, skin lesion, skin wound, itching,dry skin  Neuro: Denies headache, numbness, tingling, confusion, loss of consciousness, dizziness ,vertigo  Psychiatric: Denies feelings of depression, suicidal ideation, anxiety, sleep disturbances    OBJECTIVE  /72   Pulse (!) 111   Temp 97.6 °F (36.4 °C)   Ht 5' 4\" (1.626 m)   Wt 97.3 kg (214 lb 9.6 oz)   LMP  (LMP Unknown)   SpO2 95%   BMI 36.84 kg/m²   Constitutional:   NAD, well appearing and well nourished      ENT:   Conjunctiva and lids: no injection, edema, or discharge     Pupils and iris: SANTI bilaterally    External inspection of ears and nose: normal without deformities or discharge.      Otoscopic exam: Canals patent without erythema; tm are dull and erythematous bilaterally       Nasal mucosa, septum and turbinates: Turbinae injection with discharge   Oropharynx:  Moist mucosa, normal tongue and tonsils without lesions.Erythema and injection  of post pharynx with pnd     Pulmonary:Respiratory effort normal rate and rhythm, no increased work of breathing. Auscultation of lungs:  Scattered rhonchi and expiratory wheezes bilaterally      Cardiovascular: regular rate and rhythm, S1 and S2, no murmur, no edema and/or varicosities of LE      Abdomen: Soft and non-distended    Positive bowel sounds    No heptomegaly or splenomegaly    Gu: no suprapubic tenderness, no CVA tenderness, or urethral discharge  Lymphatic: Anterior cervical lymphadenopathy     Muscskeletal:  Gait and station: Normal gait     Digits and nails normal without clubbing or cyanosis      Inspection/palpation of joints, bones, and muscles:  No joint tenderness, swelling, full active and passive range of motion  Skin: Normal skin turgor and no " rashes      Neuro:    Normal reflexes       Psych:   alert and oriented to person, place and time     normal mood and affect       Assessment/Plan:Diagnoses and all orders for this visit:    Asthmatic bronchitis without complication, unspecified asthma severity, unspecified whether persistent  -     predniSONE 5 mg tablet; 3 tabs po bid x2 days, then 2 tabs po bid x2 days, then 1 tab bid x2 days, then 1 daily until done.  -     cefuroxime (CEFTIN) 500 mg tablet; Take 1 tablet (500 mg total) by mouth every 12 (twelve) hours for 10 days    Flu-like symptoms  -     Cancel: POCT Rapid Covid         Reviewed with patient plan to treat with above plan.    Patient instructed to call in 72 hours if not feeling better or if symptoms worsen

## 2024-10-07 ENCOUNTER — PATIENT MESSAGE (OUTPATIENT)
Dept: FAMILY MEDICINE CLINIC | Facility: CLINIC | Age: 59
End: 2024-10-07

## 2024-10-17 DIAGNOSIS — R05.9 COUGH: ICD-10-CM

## 2024-10-17 RX ORDER — ALBUTEROL SULFATE 90 UG/1
INHALANT RESPIRATORY (INHALATION)
Qty: 18 G | Refills: 0 | Status: SHIPPED | OUTPATIENT
Start: 2024-10-17

## 2024-12-12 ENCOUNTER — OFFICE VISIT (OUTPATIENT)
Dept: FAMILY MEDICINE CLINIC | Facility: CLINIC | Age: 59
End: 2024-12-12
Payer: COMMERCIAL

## 2024-12-12 VITALS
TEMPERATURE: 97.7 F | OXYGEN SATURATION: 94 % | WEIGHT: 216.2 LBS | DIASTOLIC BLOOD PRESSURE: 70 MMHG | BODY MASS INDEX: 37.11 KG/M2 | HEART RATE: 110 BPM | SYSTOLIC BLOOD PRESSURE: 108 MMHG

## 2024-12-12 DIAGNOSIS — U07.1 COVID-19: ICD-10-CM

## 2024-12-12 DIAGNOSIS — R68.89 FLU-LIKE SYMPTOMS: Primary | ICD-10-CM

## 2024-12-12 LAB
SARS-COV-2 AG UPPER RESP QL IA: POSITIVE
SL AMB POCT RAPID FLU A: NORMAL
SL AMB POCT RAPID FLU B: NORMAL
VALID CONTROL: ABNORMAL

## 2024-12-12 PROCEDURE — 87804 INFLUENZA ASSAY W/OPTIC: CPT | Performed by: FAMILY MEDICINE

## 2024-12-12 PROCEDURE — 87811 SARS-COV-2 COVID19 W/OPTIC: CPT | Performed by: FAMILY MEDICINE

## 2024-12-12 PROCEDURE — 99213 OFFICE O/P EST LOW 20 MIN: CPT | Performed by: FAMILY MEDICINE

## 2024-12-12 RX ORDER — PREDNISONE 10 MG/1
TABLET ORAL
Qty: 26 TABLET | Refills: 0 | Status: SHIPPED | OUTPATIENT
Start: 2024-12-12

## 2024-12-12 RX ORDER — BROMPHENIRAMINE MALEATE, PSEUDOEPHEDRINE HYDROCHLORIDE, AND DEXTROMETHORPHAN HYDROBROMIDE 2; 30; 10 MG/5ML; MG/5ML; MG/5ML
5 SYRUP ORAL 4 TIMES DAILY PRN
Qty: 180 ML | Refills: 0 | Status: SHIPPED | OUTPATIENT
Start: 2024-12-12

## 2024-12-12 RX ORDER — PANTOPRAZOLE SODIUM 40 MG/1
40 FOR SUSPENSION ORAL DAILY
COMMUNITY
Start: 2024-12-11

## 2024-12-12 RX ORDER — AZITHROMYCIN 250 MG/1
TABLET, FILM COATED ORAL
Qty: 12 TABLET | Refills: 0 | Status: SHIPPED | OUTPATIENT
Start: 2024-12-12 | End: 2024-12-16

## 2024-12-12 RX ORDER — CODEINE PHOSPHATE AND GUAIFENESIN 10; 100 MG/5ML; MG/5ML
5 SOLUTION ORAL
Qty: 118 ML | Refills: 0 | Status: SHIPPED | OUTPATIENT
Start: 2024-12-12

## 2024-12-15 DIAGNOSIS — N95.2 POSTMENOPAUSAL ATROPHIC VAGINITIS: ICD-10-CM

## 2024-12-17 NOTE — PROGRESS NOTES
COVID-19 Outpatient Progress Note  Name: Palma Green      : 1965      MRN: [id number]  Encounter Provider: Raiza Campos DO  Encounter Date: 2024   Encounter department: [facility]    Assessment & Plan  COVID-19    Orders:    brompheniramine-pseudoephedrine-DM 30-2-10 MG/5ML syrup; Take 5 mL by mouth 4 (four) times a day as needed for congestion or cough    predniSONE 10 mg tablet; 3 tabs po bid x2 days, then 2 tabs po bid x2 days, then 1 tab bid x2 days, then 1 daily until done.    guaiFENesin-codeine (guaiFENesin AC) 100-10 mg/5 mL oral solution; Take 5 mL by mouth daily at bedtime    azithromycin (ZITHROMAX) 250 mg tablet; Take 2 tablets today then 1 tablet daily    Flu-like symptoms    Orders:    POCT Rapid Covid Ag    POCT rapid flu A and B    Disposition:     I have spent a total time of 15 minutes on the day of the encounter for this patient including discussing diagnostic results, discussing prognosis, risks and benefits of treatment options, instructions for management, patient and family education, importance of treatment compliance, risk factor reductions, impressions, counseling/coordination of care and documenting in the medical record.          Encounter provider: Raiza Campos DO     Provider located at: [address] [id number]     Recent Visits  Date Type Provider Dept   24 Office Visit Raiza Campos DO [facility]   Showing recent visits within past 7 days and meeting all other requirements  Future Appointments  No visits were found meeting these conditions.  Showing future appointments within next 150 days and meeting all other requirements    History of Present Illness      Subjective:   Palma Green is a 59 y.o. female who has been screened for COVID-19. Symptom change since last report: unchanged. Patient's symptoms include nasal congestion,  rhinorrhea, sore throat, anosmia, cough and myalgias. Patient denies fever, chills, fatigue, malaise, loss of taste, shortness of breath, chest tightness, abdominal pain, nausea, vomiting, diarrhea and headaches.     - Date of positive COVID-19 test: 12/12/2024. Type of test: Home antigen.     COVID-19 vaccination status: Fully vaccinated with booster    Palma has been staying home and has isolated themselves in her home. She is taking care to not share personal items and is cleaning all surfaces that are touched often, like counters, tabletops, and doorknobs using household cleaning sprays or wipes. She is wearing a mask when she leaves her room.     Lab Results   Component Value Date    SARSCOV2 Negative 12/16/2021    SARSCOVAG Positive (A) 12/12/2024       Review of Systems   Constitutional:  Negative for appetite change, chills, fatigue and fever.   HENT:  Positive for congestion, postnasal drip, rhinorrhea, sinus pressure, sinus pain and sore throat. Negative for ear pain, facial swelling, nosebleeds, sneezing, tinnitus and trouble swallowing.    Eyes:  Negative for discharge and redness.   Respiratory:  Positive for cough. Negative for chest tightness, shortness of breath and wheezing.    Cardiovascular:  Negative for chest pain and palpitations.   Gastrointestinal:  Negative for abdominal pain, diarrhea, nausea and vomiting.   Endocrine: Negative for polyuria.   Genitourinary:  Negative for dysuria and urgency.   Musculoskeletal:  Positive for myalgias. Negative for arthralgias and back pain.   Skin:  Negative for rash.   Neurological:  Negative for dizziness, weakness and headaches.   Hematological:  Negative for adenopathy.   Psychiatric/Behavioral:  Negative for behavioral problems, confusion and sleep disturbance.    All other systems reviewed and are negative.    Objective   /70   Pulse (!) 110   Temp 97.7 °F (36.5 °C)   Wt 98.1 kg (216 lb 3.2 oz)   LMP  (LMP Unknown)   SpO2 94%   BMI 37.11  kg/m²     Physical Exam  Vitals reviewed.   Constitutional:       Appearance: Normal appearance. She is not ill-appearing.   HENT:      Head: Normocephalic.      Right Ear: Tympanic membrane and ear canal normal.      Left Ear: Tympanic membrane and ear canal normal.      Ears:      Comments: TM are dull and erythematous     Nose: Nose normal. No congestion.      Mouth/Throat:      Pharynx: Posterior oropharyngeal erythema present.   Eyes:      Extraocular Movements: Extraocular movements intact.      Conjunctiva/sclera: Conjunctivae normal.      Pupils: Pupils are equal, round, and reactive to light.   Cardiovascular:      Rate and Rhythm: Normal rate.      Pulses: Normal pulses.      Heart sounds: Normal heart sounds. No murmur heard.  Pulmonary:      Effort: No respiratory distress.      Breath sounds: No stridor. No wheezing or rhonchi.   Chest:      Chest wall: No tenderness.   Abdominal:      General: Abdomen is flat. Bowel sounds are normal. There is no distension.      Palpations: Abdomen is soft. There is no mass.      Tenderness: There is no abdominal tenderness.   Musculoskeletal:         General: No swelling or deformity.      Cervical back: Normal range of motion and neck supple. No rigidity or tenderness.      Right lower leg: No edema.      Left lower leg: No edema.   Lymphadenopathy:      Cervical: Cervical adenopathy present.   Skin:     General: Skin is warm.      Capillary Refill: Capillary refill takes less than 2 seconds.      Coloration: Skin is not jaundiced or pale.      Findings: No erythema or rash.   Neurological:      General: No focal deficit present.      Mental Status: She is alert and oriented to person, place, and time. Mental status is at baseline.   Psychiatric:         Mood and Affect: Mood normal.         Behavior: Behavior normal.         Thought Content: Thought content normal.         Judgment: Judgment normal.

## 2024-12-18 RX ORDER — ESTRADIOL 0.1 MG/G
CREAM VAGINAL
Qty: 42.5 G | Refills: 3 | Status: SHIPPED | OUTPATIENT
Start: 2024-12-18

## 2025-01-08 ENCOUNTER — IMMUNIZATIONS (OUTPATIENT)
Dept: FAMILY MEDICINE CLINIC | Facility: CLINIC | Age: 60
End: 2025-01-08
Payer: COMMERCIAL

## 2025-01-08 DIAGNOSIS — Z23 ENCOUNTER FOR IMMUNIZATION: Primary | ICD-10-CM

## 2025-01-08 PROCEDURE — 90673 RIV3 VACCINE NO PRESERV IM: CPT

## 2025-01-08 PROCEDURE — 90471 IMMUNIZATION ADMIN: CPT

## 2025-02-21 DIAGNOSIS — E03.9 HYPOTHYROIDISM, UNSPECIFIED TYPE: ICD-10-CM

## 2025-02-21 DIAGNOSIS — F32.A DEPRESSION, UNSPECIFIED DEPRESSION TYPE: ICD-10-CM

## 2025-02-21 RX ORDER — LEVOTHYROXINE SODIUM 112 UG/1
112 TABLET ORAL DAILY
Qty: 90 TABLET | Refills: 3 | Status: SHIPPED | OUTPATIENT
Start: 2025-02-21

## 2025-02-21 RX ORDER — ESCITALOPRAM OXALATE 10 MG/1
15 TABLET ORAL DAILY
Qty: 135 TABLET | Refills: 3 | Status: SHIPPED | OUTPATIENT
Start: 2025-02-21

## 2025-03-04 DIAGNOSIS — Z78.0 ASYMPTOMATIC POSTMENOPAUSAL ESTROGEN DEFICIENCY: ICD-10-CM

## 2025-03-04 DIAGNOSIS — Z12.31 ENCOUNTER FOR SCREENING MAMMOGRAM FOR BREAST CANCER: Primary | ICD-10-CM

## 2025-03-10 ENCOUNTER — OFFICE VISIT (OUTPATIENT)
Dept: FAMILY MEDICINE CLINIC | Facility: CLINIC | Age: 60
End: 2025-03-10
Payer: COMMERCIAL

## 2025-03-10 VITALS
SYSTOLIC BLOOD PRESSURE: 124 MMHG | WEIGHT: 216.6 LBS | DIASTOLIC BLOOD PRESSURE: 80 MMHG | BODY MASS INDEX: 36.98 KG/M2 | HEIGHT: 64 IN | OXYGEN SATURATION: 94 % | TEMPERATURE: 97.3 F | HEART RATE: 104 BPM

## 2025-03-10 DIAGNOSIS — J01.90 ACUTE SINUSITIS, RECURRENCE NOT SPECIFIED, UNSPECIFIED LOCATION: Primary | ICD-10-CM

## 2025-03-10 DIAGNOSIS — R06.83 SNORING: ICD-10-CM

## 2025-03-10 PROCEDURE — 99214 OFFICE O/P EST MOD 30 MIN: CPT | Performed by: FAMILY MEDICINE

## 2025-03-10 RX ORDER — PREDNISONE 5 MG/1
TABLET ORAL
Qty: 26 TABLET | Refills: 0 | Status: SHIPPED | OUTPATIENT
Start: 2025-03-10

## 2025-03-10 RX ORDER — CEFUROXIME AXETIL 500 MG/1
500 TABLET ORAL EVERY 12 HOURS SCHEDULED
Qty: 20 TABLET | Refills: 0 | Status: SHIPPED | OUTPATIENT
Start: 2025-03-10 | End: 2025-03-20

## 2025-03-10 NOTE — PROGRESS NOTES
Patient ID: Palma Green is a 59 y.o. female.    HPI: 59 y.o.female presenting with symptoms of sinus pain,pressure, nasal congestion, pnd dry cough , ear and throat pain.  Symptoms have been going on for the past 5 days.  She also states that she has been snoring while sleeping and wakes up with unrefreshed sleep, myalgia and daytime somnulence.      SUBJECTIVE    Family History   Problem Relation Age of Onset    Hypertension Father         essential    Hyperlipidemia Father     Hearing loss Father     Vision loss Father     Hypertension Maternal Grandmother         essential    Hyperlipidemia Maternal Grandmother     Hearing loss Maternal Grandmother     Vision loss Maternal Grandmother     Osteoarthritis Maternal Grandmother     Hypertension Maternal Grandfather         essential    Hyperlipidemia Maternal Grandfather     Osteoarthritis Maternal Grandfather     Diabetes Paternal Grandmother         mellitus    Hypertension Paternal Grandmother         essential    Stroke Paternal Grandmother         syndrome    Colon cancer Family     Osteoarthritis Mother     Alcohol abuse Brother     Substance Abuse Brother     Mental illness Brother     COPD Brother     No Known Problems Son     Stroke Paternal Grandfather     No Known Problems Son      Social History     Socioeconomic History    Marital status: /Civil Union     Spouse name: Not on file    Number of children: Not on file    Years of education: Not on file    Highest education level: Not on file   Occupational History    Not on file   Tobacco Use    Smoking status: Never     Passive exposure: Past    Smokeless tobacco: Never   Vaping Use    Vaping status: Never Used   Substance and Sexual Activity    Alcohol use: Not Currently     Alcohol/week: 1.0 standard drink of alcohol     Types: 1 Standard drinks or equivalent per week    Drug use: Never    Sexual activity: Yes     Partners: Male     Birth control/protection: Post-menopausal   Other Topics Concern     Not on file   Social History Narrative    Daily cola/tea consumption     Social Drivers of Health     Financial Resource Strain: Not on file   Food Insecurity: Not on file   Transportation Needs: Not on file   Physical Activity: Not on file   Stress: Not on file   Social Connections: Not on file   Intimate Partner Violence: Not on file   Housing Stability: Not on file     Past Medical History:   Diagnosis Date    Acute gastritis     Asthma     Chronic cough     Depression     Disease of thyroid gland     hypo    Esophageal reflux     Fatigue     High cholesterol     Hypothyroidism     Otitis media     Pharyngitis     Polymyalgia rheumatica (HCC)     URI (upper respiratory infection)      Past Surgical History:   Procedure Laterality Date     SECTION      CHOLECYSTECTOMY      EYE SURGERY      [place] COLONOSCOPY FLX DX W/COLLJ SPEC WHEN PFRMD N/A 2016    Procedure: EGD AND COLONOSCOPY;  Surgeon: Luis Morrow MD;  Location: [facility];  Service: Gastroenterology     Allergies   Allergen Reactions    Aspirin     Celebrex [Celecoxib] Angioedema       Current Outpatient Medications:     albuterol (PROVENTIL HFA,VENTOLIN HFA) 90 mcg/act inhaler, INHALE 2 PUFFS EVERY 6 HOURS AS NEEDED FOR WHEEZING, Disp: 18 g, Rfl: 0    azelastine (ASTELIN) 0.1 % nasal spray, 1 spray into each nostril 2 (two) times a day Use in each nostril as directed, Disp: 1 mL, Rfl: 2    cefuroxime (CEFTIN) 500 mg tablet, Take 1 tablet (500 mg total) by mouth every 12 (twelve) hours for 10 days, Disp: 20 tablet, Rfl: 0    escitalopram (LEXAPRO) 10 mg tablet, TAKE 1 AND 1/2 TABLETS BY MOUTH  DAILY, Disp: 135 tablet, Rfl: 3    estradiol (ESTRACE) 0.1 mg/g vaginal cream, INSERT 1 GRAM VAGINALLY 3 TIMES  WEEKLY, Disp: 42.5 g, Rfl: 3    levothyroxine 112 mcg tablet, TAKE 1 TABLET BY MOUTH DAILY, Disp: 90 tablet, Rfl: 3    pantoprazole (PROTONIX) 40 mg, Take 40 mg by mouth in the morning, Disp: , Rfl:     predniSONE 5 mg tablet, 3 tabs po  "bid x2 days, then 2 tabs po bid x2 days, then 1 tab bid x2 days, then 1 daily until done., Disp: 26 tablet, Rfl: 0    ALPRAZolam (XANAX) 0.25 mg tablet, Take 1 tablet (0.25 mg total) by mouth 3 (three) times a day as needed for anxiety, Disp: 60 tablet, Rfl: 0    Review of Systems  Constitutional:     Denies fever, chills, fatigue, weakness ,weight loss, weight gain  + fatigue    ENT: Denies earache, loss of hearing, nosebleed, nasal discharge,but complains of nasal congestion, sore throat,hoarseness and sinus pain and pressure+ snoring    Pulmonary: Denies shortness of breath ,cough , dyspnea on exertionon, orthopnea ,+ PND   Cardiovascular:  Denies bradycardia , tachycardia ,palpations, lower extremity, edema leg, claudication  Breast:  Denies new or changing breast lumps,  nipple discharge, nipple changes,  Abdomen:  Denies abdominal pain , anorexia ,indigestion, nausea ,vomiting, constipation , diarrhea  Musculoskeletal: Denies myalgias, arthralgias, joint swelling, joint stiffness ,limb pain, limb swelling  Lymph:+ swollen glands  Gu: no dysuria or urinary frequency  Skin: Denies skin rash, skin lesion, skin wound, itching,dry skin  Neuro: Denies headache, numbness, tingling, confusion, loss of consciousness, dizziness ,vertigo  Psychiatric: Denies feelings of depression, suicidal ideation, anxiety, +sleep disturbances    OBJECTIVE  /80   Pulse 104   Temp (!) 97.3 °F (36.3 °C)   Ht 5' 4\" (1.626 m)   Wt 98.2 kg (216 lb 9.6 oz)   LMP  (LMP Unknown)   SpO2 94%   BMI 37.18 kg/m²   Constitutional:   NAD, well appearing and well nourished      ENT:   Conjunctiva and lids: no injection, edema, or discharge    Pupils and iris: SANTI bilaterally   External inspection of ears and nose: normal without deformities or discharge.      Otoscopic exam: Canals patent ; tm are dull, with with erythem and effusions  ENasal mucosa, septum and turbinates: Turbinae injection with discharge   Oropharynx:  Moist mucosa, " normal tongue and tonsils without lesions.Erythema and injection  of post pharynx with pnd      Pulmonary:Respiratory effort normal rate and rhythm, no increased work of breathing. Auscultation of lungs:  Clear bilaterally with no adventitious breath sounds       Cardiovascular: regular rate and rhythm, S1 and S2, no murmur, no edema and/or varicosities of LE      Abdomen: Soft and non-distended    Positive bowel sounds    No heptomegaly or splenomegaly    Lymphatic: Anterior  cervical lymphadenopathy         Muscskeletal:  Gait and station: Normal gait     Digits and nails normal without clubbing or cyanosis     Inspection/palpation of joints, bones, and muscles:  No joint tenderness, swelling, full active and passive range of motion      Gu: no suprabubic tenderness, CVA tenderness or urethral discharge  Skin: Normal skin turgor and no rashes    Neuro:    Normal reflexes   Psych:   alert and oriented to person, place and time  normal mood and affec      Assessment/Plan:Diagnoses and all orders for this visit:    Acute sinusitis, recurrence not specified, unspecified location  -     predniSONE 5 mg tablet; 3 tabs po bid x2 days, then 2 tabs po bid x2 days, then 1 tab bid x2 days, then 1 daily until done.  -     cefuroxime (CEFTIN) 500 mg tablet; Take 1 tablet (500 mg total) by mouth every 12 (twelve) hours for 10 days    Snoring  -     Ambulatory Referral to Sleep Medicine; Future        Reviewed with patient plan to treat with above plan.    Patient instructed to call in 72 hours if not feeling better or if symptoms worsen

## 2025-03-13 ENCOUNTER — TRANSCRIBE ORDERS (OUTPATIENT)
Dept: SLEEP CENTER | Facility: CLINIC | Age: 60
End: 2025-03-13

## 2025-03-13 DIAGNOSIS — R06.83 SNORING: Primary | ICD-10-CM

## 2025-04-02 ENCOUNTER — HOSPITAL ENCOUNTER (OUTPATIENT)
Dept: RADIOLOGY | Age: 60
Discharge: HOME | End: 2025-04-02
Payer: COMMERCIAL

## 2025-04-02 DIAGNOSIS — Z12.31 ENCOUNTER FOR SCREENING MAMMOGRAM FOR BREAST CANCER: ICD-10-CM

## 2025-04-02 PROCEDURE — 77063 BREAST TOMOSYNTHESIS BI: CPT

## 2025-04-02 PROCEDURE — 77067 SCR MAMMO BI INCL CAD: CPT

## 2025-04-07 ENCOUNTER — RESULTS FOLLOW-UP (OUTPATIENT)
Dept: FAMILY MEDICINE CLINIC | Facility: CLINIC | Age: 60
End: 2025-04-07

## 2025-04-14 ENCOUNTER — OFFICE VISIT (OUTPATIENT)
Dept: FAMILY MEDICINE CLINIC | Facility: CLINIC | Age: 60
End: 2025-04-14
Payer: COMMERCIAL

## 2025-04-14 VITALS
WEIGHT: 218.5 LBS | DIASTOLIC BLOOD PRESSURE: 82 MMHG | HEART RATE: 76 BPM | SYSTOLIC BLOOD PRESSURE: 118 MMHG | TEMPERATURE: 97.8 F | HEIGHT: 64 IN | OXYGEN SATURATION: 95 % | RESPIRATION RATE: 13 BRPM | BODY MASS INDEX: 37.3 KG/M2

## 2025-04-14 DIAGNOSIS — H69.91 EUSTACHIAN TUBE DYSFUNCTION, RIGHT: Primary | ICD-10-CM

## 2025-04-14 PROCEDURE — 96372 THER/PROPH/DIAG INJ SC/IM: CPT | Performed by: FAMILY MEDICINE

## 2025-04-14 PROCEDURE — 99213 OFFICE O/P EST LOW 20 MIN: CPT | Performed by: FAMILY MEDICINE

## 2025-04-14 RX ORDER — KETOROLAC TROMETHAMINE 30 MG/ML
60 INJECTION, SOLUTION INTRAMUSCULAR; INTRAVENOUS ONCE
Status: COMPLETED | OUTPATIENT
Start: 2025-04-14 | End: 2025-04-14

## 2025-04-14 RX ORDER — OXYCODONE AND ACETAMINOPHEN 5; 325 MG/1; MG/1
1 TABLET ORAL EVERY 4 HOURS PRN
Qty: 12 TABLET | Refills: 0 | Status: SHIPPED | OUTPATIENT
Start: 2025-04-14

## 2025-04-14 RX ORDER — CEFUROXIME AXETIL 500 MG/1
500 TABLET ORAL EVERY 12 HOURS SCHEDULED
Qty: 20 TABLET | Refills: 0 | Status: SHIPPED | OUTPATIENT
Start: 2025-04-14 | End: 2025-04-24

## 2025-04-14 RX ORDER — PREDNISONE 10 MG/1
TABLET ORAL
Qty: 26 TABLET | Refills: 0 | Status: SHIPPED | OUTPATIENT
Start: 2025-04-14

## 2025-04-14 RX ADMIN — KETOROLAC TROMETHAMINE 60 MG: 30 INJECTION, SOLUTION INTRAMUSCULAR; INTRAVENOUS at 10:57

## 2025-04-16 NOTE — PROGRESS NOTES
Patient ID: Palma Green is a 60 y.o. female.      Diagnoses and all orders for this visit:    Eustachian tube dysfunction, right  -     predniSONE 10 mg tablet; 3 tabs po bid x2 days, then 2 tabs po bid x2 days, then 1 tab bid x2 days, then 1 daily until done.  -     ketorolac (TORADOL) 60 mg/2 mL IM injection 60 mg  -     cefuroxime (CEFTIN) 500 mg tablet; Take 1 tablet (500 mg total) by mouth every 12 (twelve) hours for 10 days  -     oxyCODONE-acetaminophen (Percocet) 5-325 mg per tablet; Take 1 tablet by mouth every 4 (four) hours as needed for moderate pain Max Daily Amount: 6 tablets  Reviewed with patient plan to treat with above plan.   Patient instructed to call in 72 hours if not feeling better or if symptoms worsen       HPI: 60 y.o.female presenting with symptoms of right ear pressure, crackling of ears and dizziness associated with positional changes.  Symptoms for the past 4 days.      SUBJECTIVE    Family History   Problem Relation Age of Onset    Osteoarthritis Mother     Hypertension Father         essential    Hyperlipidemia Father     Hearing loss Father     Vision loss Father     Hypertension Maternal Grandmother         essential    Hyperlipidemia Maternal Grandmother     Hearing loss Maternal Grandmother     Vision loss Maternal Grandmother     Osteoarthritis Maternal Grandmother     Hypertension Maternal Grandfather         essential    Hyperlipidemia Maternal Grandfather     Osteoarthritis Maternal Grandfather     Diabetes Paternal Grandmother         mellitus    Hypertension Paternal Grandmother         essential    Stroke Paternal Grandmother         syndrome    Stroke Paternal Grandfather     Alcohol abuse Brother     Substance Abuse Brother     Mental illness Brother     COPD Brother     No Known Problems Son     No Known Problems Son     Colon cancer Family     No Known Problems Maternal Aunt     No Known Problems Paternal Aunt      Social History     Socioeconomic History    Marital  status: /Civil Union     Spouse name: Not on file    Number of children: Not on file    Years of education: Not on file    Highest education level: Not on file   Occupational History    Not on file   Tobacco Use    Smoking status: Never     Passive exposure: Past    Smokeless tobacco: Never   Vaping Use    Vaping status: Never Used   Substance and Sexual Activity    Alcohol use: Not Currently     Alcohol/week: 1.0 standard drink of alcohol     Types: 1 Standard drinks or equivalent per week    Drug use: Never    Sexual activity: Yes     Partners: Male     Birth control/protection: Post-menopausal   Other Topics Concern    Not on file   Social History Narrative    Daily cola/tea consumption     Social Drivers of Health     Financial Resource Strain: Not on file   Food Insecurity: Not on file   Transportation Needs: Not on file   Physical Activity: Not on file   Stress: Not on file   Social Connections: Not on file   Intimate Partner Violence: Not on file   Housing Stability: Not on file     Past Medical History:   Diagnosis Date    Acute gastritis     Asthma     Chronic cough     Depression     Disease of thyroid gland     hypo    Esophageal reflux     Fatigue     High cholesterol     Hypothyroidism     Otitis media     Pharyngitis     Polymyalgia rheumatica (HCC)     URI (upper respiratory infection)      Past Surgical History:   Procedure Laterality Date     SECTION      CHOLECYSTECTOMY      EYE SURGERY      CT COLONOSCOPY FLX DX W/COLLJ SPEC WHEN PFRMD N/A 2016    Procedure: EGD AND COLONOSCOPY;  Surgeon: Luis Morrow MD;  Location: [facility];  Service: Gastroenterology     Allergies   Allergen Reactions    Aspirin     Celebrex [Celecoxib] Angioedema       Current Outpatient Medications:     albuterol (PROVENTIL HFA,VENTOLIN HFA) 90 mcg/act inhaler, INHALE 2 PUFFS EVERY 6 HOURS AS NEEDED FOR WHEEZING, Disp: 18 g, Rfl: 0    ALPRAZolam (XANAX) 0.25 mg tablet, Take 1 tablet (0.25 mg total)  by mouth 3 (three) times a day as needed for anxiety, Disp: 60 tablet, Rfl: 0    azelastine (ASTELIN) 0.1 % nasal spray, 1 spray into each nostril 2 (two) times a day Use in each nostril as directed, Disp: 1 mL, Rfl: 2    cefuroxime (CEFTIN) 500 mg tablet, Take 1 tablet (500 mg total) by mouth every 12 (twelve) hours for 10 days, Disp: 20 tablet, Rfl: 0    escitalopram (LEXAPRO) 10 mg tablet, TAKE 1 AND 1/2 TABLETS BY MOUTH  DAILY, Disp: 135 tablet, Rfl: 3    estradiol (ESTRACE) 0.1 mg/g vaginal cream, INSERT 1 GRAM VAGINALLY 3 TIMES  WEEKLY, Disp: 42.5 g, Rfl: 3    levothyroxine 112 mcg tablet, TAKE 1 TABLET BY MOUTH DAILY, Disp: 90 tablet, Rfl: 3    oxyCODONE-acetaminophen (Percocet) 5-325 mg per tablet, Take 1 tablet by mouth every 4 (four) hours as needed for moderate pain Max Daily Amount: 6 tablets, Disp: 12 tablet, Rfl: 0    pantoprazole (PROTONIX) 40 mg, Take 40 mg by mouth in the morning, Disp: , Rfl:     predniSONE 10 mg tablet, 3 tabs po bid x2 days, then 2 tabs po bid x2 days, then 1 tab bid x2 days, then 1 daily until done., Disp: 26 tablet, Rfl: 0    Review of Systems  Constitutional:     Denies fever, chills, fatigue, weakness ,weight loss, weight gain       ENT: Denies earache, loss of hearing, nosebleed, nasal discharge,nasal congestion, sore throat,hoarseness, but complains of ear pressure,and crackling    Pulmonary: Denies shortness of breath ,cough , dyspnea on exertionon, orthopnea , PND   Cardiovascular:  Denies bradycardia , tachycardia ,palpations, lower extremity, edema leg, claudication  Breast:  Denies new or changing breast lumps,  nipple discharge, nipple changes,  Abdomen:  Denies abdominal pain , anorexia ,indigestion, nausea ,vomiting, constipation , diarrhea  Musculoskeletal: Denies myalgias, arthralgias, joint swelling, joint stiffness ,limb pain, limb swelling  Lymph:denies swollen glands  Gu: no dysuria or urinary frequency  Skin: Denies skin rash, skin lesion, skin wound,  "itching,dry skin  Neuro: Denies headache, numbness, tingling, confusion, loss of consciousness, but complains of postitional vertigo  Psychiatric: Denies feelings of depression, suicidal ideation, anxiety, sleep disturbances    OBJECTIVE  /82   Pulse 76   Temp 97.8 °F (36.6 °C)   Resp 13   Ht 5' 4\" (1.626 m)   Wt 99.1 kg (218 lb 8 oz)   LMP  (LMP Unknown)   SpO2 95%   BMI 37.51 kg/m²   Constitutional:   NAD, well appearing and well nourished      ENT:   Conjunctiva and lids: no injection, edema, or discharge     Pupils and iris: SANTI bilaterally    External inspection of ears and nose: normal without deformities or discharge.      Otoscopic exam: Canals patent with right tm dull, erythematous with effusion  ENasal mucosa, septum and turbinates: Turbinae injection with discharge   Oropharynx:  Moist mucosa, normal tongue and tonsils without lesions.Erythema and injection  of post pharynx with pnd      Pulmonary:Respiratory effort normal rate and rhythm, no increased work of breathing. Auscultation of lungs:  Clear bilaterally with no adventitious breath sounds       Cardiovascular: regular rate and rhythm, S1 and S2, no murmur, no edema and/or varicosities of LE      Abdomen: Soft and non-distended     Positive bowel sounds      No heptomegaly or splenomegaly      Lymphatic: Anterior and posterior cervical lymphadenopathy         Muscskeletal:  Gait and station: Normal gait      Digits and nails normal without clubbing or cyanosis       Inspection/palpation of joints, bones, and muscles:  No joint tenderness, swelling, full active and passive range of motion       Gu: no suprabubic tenderness, CVA tenderness or urethral discharge  Skin: Normal skin turgor and no rashes      Neuro:     Normal reflexes     Psych:   alert and oriented to person, place and time     normal mood and affect             "

## 2025-04-21 ENCOUNTER — HOSPITAL ENCOUNTER (OUTPATIENT)
Dept: RADIOLOGY | Facility: MEDICAL CENTER | Age: 60
Discharge: HOME | End: 2025-04-21
Payer: COMMERCIAL

## 2025-04-21 DIAGNOSIS — Z78.0 ASYMPTOMATIC POSTMENOPAUSAL ESTROGEN DEFICIENCY: ICD-10-CM

## 2025-04-21 PROCEDURE — 77080 DXA BONE DENSITY AXIAL: CPT

## 2025-04-25 DIAGNOSIS — K04.7 DENTAL ABSCESS: Primary | ICD-10-CM

## 2025-04-25 RX ORDER — CEFUROXIME AXETIL 500 MG/1
500 TABLET ORAL EVERY 24 HOURS
Qty: 30 TABLET | Refills: 0 | Status: SHIPPED | OUTPATIENT
Start: 2025-04-25 | End: 2025-05-25

## 2025-04-29 ENCOUNTER — HOSPITAL ENCOUNTER (OUTPATIENT)
Facility: HOSPITAL | Age: 60
Discharge: HOME | End: 2025-04-29
Payer: COMMERCIAL

## 2025-04-29 DIAGNOSIS — R06.83 SNORING: ICD-10-CM

## 2025-04-29 PROCEDURE — G0399 HOME SLEEP TEST/TYPE 3 PORTA: HCPCS

## 2025-04-29 NOTE — PROGRESS NOTES
Home Sleep Study Documentation    HOME STUDY DEVICE: Noxturnal no                                           Milla G3 yes device # 15      Pre-Sleep Home Study:    Set-up and instructions performed by: Elin    Technician performed demonstration for Patient: yes    Return demonstration performed by Patient: yes    Written instructions provided to Patient: yes    Patient signed consent form: yes        Post-Sleep Home Study:    Additional comments by Patient:     Home Sleep Study Failed:no:    Failure reason: N/A    Reported or Detected: N/A    Scored by: AGUSTÍN Hickman

## 2025-05-01 PROBLEM — G47.33 OSA (OBSTRUCTIVE SLEEP APNEA): Status: ACTIVE | Noted: 2025-05-01

## 2025-05-04 PROCEDURE — 95806 SLEEP STUDY UNATT&RESP EFFT: CPT | Performed by: INTERNAL MEDICINE

## 2025-05-05 ENCOUNTER — RESULTS FOLLOW-UP (OUTPATIENT)
Dept: FAMILY MEDICINE CLINIC | Facility: CLINIC | Age: 60
End: 2025-05-05

## 2025-05-05 DIAGNOSIS — G47.33 OBSTRUCTIVE SLEEP APNEA: Primary | ICD-10-CM

## 2025-05-06 ENCOUNTER — TRANSCRIBE ORDERS (OUTPATIENT)
Dept: SLEEP CENTER | Facility: CLINIC | Age: 60
End: 2025-05-06

## 2025-05-06 DIAGNOSIS — G47.33 OSA (OBSTRUCTIVE SLEEP APNEA): Primary | ICD-10-CM

## 2025-05-12 ENCOUNTER — PATIENT MESSAGE (OUTPATIENT)
Dept: FAMILY MEDICINE CLINIC | Facility: CLINIC | Age: 60
End: 2025-05-12

## 2025-05-12 DIAGNOSIS — E03.9 HYPOTHYROIDISM, UNSPECIFIED TYPE: Primary | ICD-10-CM

## 2025-05-12 DIAGNOSIS — E55.9 VITAMIN D DEFICIENCY: ICD-10-CM

## 2025-05-12 DIAGNOSIS — M35.3 PMR (POLYMYALGIA RHEUMATICA) (HCC): ICD-10-CM

## 2025-05-12 DIAGNOSIS — R53.83 OTHER FATIGUE: ICD-10-CM

## 2025-05-12 DIAGNOSIS — Z00.00 ANNUAL PHYSICAL EXAM: ICD-10-CM

## 2025-05-12 NOTE — PATIENT COMMUNICATION
LM - lab orders are in and pt should fast for at least 10-12 hours. Please call back with any questions.

## 2025-05-14 LAB
25(OH)D3 SERPL-MCNC: 19 NG/ML (ref 30–100)
ALBUMIN SERPL-MCNC: 4.1 G/DL (ref 3.6–5.1)
ALBUMIN/GLOB SERPL: 1.4 (CALC) (ref 1–2.5)
ALP SERPL-CCNC: 79 U/L (ref 37–153)
ALT SERPL-CCNC: 20 U/L (ref 6–29)
AST SERPL-CCNC: 17 U/L (ref 10–35)
BASOPHILS # BLD AUTO: 38 CELLS/UL (ref 0–200)
BASOPHILS NFR BLD AUTO: 0.6 %
BILIRUB SERPL-MCNC: 0.3 MG/DL (ref 0.2–1.2)
BUN SERPL-MCNC: 15 MG/DL (ref 7–25)
BUN/CREAT SERPL: NORMAL (CALC) (ref 6–22)
CALCIUM SERPL-MCNC: 9.7 MG/DL (ref 8.6–10.4)
CHLORIDE SERPL-SCNC: 101 MMOL/L (ref 98–110)
CHOLEST SERPL-MCNC: 234 MG/DL
CHOLEST/HDLC SERPL: 4.7 (CALC)
CO2 SERPL-SCNC: 28 MMOL/L (ref 20–32)
CREAT SERPL-MCNC: 0.78 MG/DL (ref 0.5–1.03)
CRP SERPL-MCNC: 10.3 MG/L
EOSINOPHIL # BLD AUTO: 170 CELLS/UL (ref 15–500)
EOSINOPHIL NFR BLD AUTO: 2.7 %
ERYTHROCYTE [DISTWIDTH] IN BLOOD BY AUTOMATED COUNT: 13.7 % (ref 11–15)
ERYTHROCYTE [SEDIMENTATION RATE] IN BLOOD BY WESTERGREN METHOD: 33 MM/H
GFR/BSA.PRED SERPLBLD CYS-BASED-ARV: 87 ML/MIN/1.73M2
GLOBULIN SER CALC-MCNC: 2.9 G/DL (CALC) (ref 1.9–3.7)
GLUCOSE SERPL-MCNC: 90 MG/DL (ref 65–99)
HCT VFR BLD AUTO: 42.2 % (ref 35–45)
HDLC SERPL-MCNC: 50 MG/DL
HGB BLD-MCNC: 14.2 G/DL (ref 11.7–15.5)
LDLC SERPL CALC-MCNC: 149 MG/DL (CALC)
LYMPHOCYTES # BLD AUTO: 1884 CELLS/UL (ref 850–3900)
LYMPHOCYTES NFR BLD AUTO: 29.9 %
MCH RBC QN AUTO: 29.5 PG (ref 27–33)
MCHC RBC AUTO-ENTMCNC: 33.6 G/DL (ref 32–36)
MCV RBC AUTO: 87.7 FL (ref 80–100)
MONOCYTES # BLD AUTO: 441 CELLS/UL (ref 200–950)
MONOCYTES NFR BLD AUTO: 7 %
NEUTROPHILS # BLD AUTO: 3767 CELLS/UL (ref 1500–7800)
NEUTROPHILS NFR BLD AUTO: 59.8 %
NONHDLC SERPL-MCNC: 184 MG/DL (CALC)
PLATELET # BLD AUTO: 369 THOUSAND/UL (ref 140–400)
PMV BLD REES-ECKER: 9.4 FL (ref 7.5–12.5)
POTASSIUM SERPL-SCNC: 4.9 MMOL/L (ref 3.5–5.3)
PROT SERPL-MCNC: 7 G/DL (ref 6.1–8.1)
RBC # BLD AUTO: 4.81 MILLION/UL (ref 3.8–5.1)
SODIUM SERPL-SCNC: 140 MMOL/L (ref 135–146)
TRIGL SERPL-MCNC: 213 MG/DL
WBC # BLD AUTO: 6.3 THOUSAND/UL (ref 3.8–10.8)

## 2025-05-15 ENCOUNTER — OFFICE VISIT (OUTPATIENT)
Dept: FAMILY MEDICINE CLINIC | Facility: CLINIC | Age: 60
End: 2025-05-15
Payer: COMMERCIAL

## 2025-05-15 VITALS
HEIGHT: 64 IN | BODY MASS INDEX: 36.19 KG/M2 | SYSTOLIC BLOOD PRESSURE: 122 MMHG | DIASTOLIC BLOOD PRESSURE: 84 MMHG | TEMPERATURE: 97.4 F | WEIGHT: 212 LBS | OXYGEN SATURATION: 96 % | RESPIRATION RATE: 15 BRPM | HEART RATE: 91 BPM

## 2025-05-15 DIAGNOSIS — E78.00 HYPERCHOLESTEROLEMIA: ICD-10-CM

## 2025-05-15 DIAGNOSIS — Z23 ENCOUNTER FOR IMMUNIZATION: ICD-10-CM

## 2025-05-15 DIAGNOSIS — Z00.00 ANNUAL PHYSICAL EXAM: Primary | ICD-10-CM

## 2025-05-15 DIAGNOSIS — F32.A DEPRESSION, UNSPECIFIED DEPRESSION TYPE: ICD-10-CM

## 2025-05-15 PROCEDURE — 90707 MMR VACCINE SC: CPT

## 2025-05-15 PROCEDURE — 99214 OFFICE O/P EST MOD 30 MIN: CPT | Performed by: FAMILY MEDICINE

## 2025-05-15 PROCEDURE — 90715 TDAP VACCINE 7 YRS/> IM: CPT

## 2025-05-15 PROCEDURE — 90472 IMMUNIZATION ADMIN EACH ADD: CPT

## 2025-05-15 PROCEDURE — 99396 PREV VISIT EST AGE 40-64: CPT | Performed by: FAMILY MEDICINE

## 2025-05-15 PROCEDURE — 90471 IMMUNIZATION ADMIN: CPT

## 2025-05-15 RX ORDER — ROSUVASTATIN CALCIUM 5 MG/1
5 TABLET, COATED ORAL DAILY
Qty: 30 TABLET | Refills: 0 | Status: SHIPPED | OUTPATIENT
Start: 2025-05-15 | End: 2025-06-14

## 2025-05-15 NOTE — PROGRESS NOTES
Adult Annual Physical  Name: Palma Kim      : 1965      MRN: [id number]  Encounter Provider: Raiza Campos DO  Encounter Date: 5/15/2025   Encounter department: [facility]    :  Assessment & Plan  Annual physical exam  Patient is due for an MMR and Tdap today.       Depression, unspecified depression type  Depression Screening Follow-up Plan: Patient's depression screening was positive with a PHQ-9 score of 5. Patient with underlying depression and was advised to continue current medications as prescribed.  Lexapro was increased from 10 to 15 mg and patient to call in 2 weeks time with an update.       Hypercholesterolemia  Crestor was initiated today and low-dose will recheck lipid panel in 3 to 4 months time.  Orders:    rosuvastatin (CRESTOR) 5 mg tablet; Take 1 tablet (5 mg total) by mouth daily    Encounter for immunization    Orders:    TDAP VACCINE GREATER THAN OR EQUAL TO 8YO IM    MMR VACCINE IM/SQ        Preventive Screenings:  - Diabetes Screening: screening up-to-date  - Cholesterol Screening: screening not indicated and has hyperlipidemia   - Hepatitis C screening: screening not indicated   - HIV screening: screening not indicated   - Cervical cancer screening: risks/benefits discussed   - Breast cancer screening: screening up-to-date   - Colon cancer screening: screening up-to-date   - Lung cancer screening: screening not indicated     Immunizations:  - Immunizations due: Prevnar 20 and Zoster (Shingrix)    Counseling/Anticipatory Guidance:    - Diet: discussed recommendations for a healthy/well-balanced diet.   - Exercise: the importance of regular exercise/physical activity was discussed. Recommend exercise 3-5 times per week for at least 30 minutes.       Depression Screening and Follow-up Plan:   Patient with underlying depression and was advised to continue current medications as prescribed.         History of Present Illness     Adult Annual  Physical:  Patient presents for annual physical. The patient presents for her annual physical today.  Her depression is mostly controlled but she feels that she has a slight amount of anhedonia and dysphoria and would like to go up to 15 mg on her Lexapro.  She denies any suicidality at this time.  In addition her lipid panel is not favorable so she has agreed to going on Crestor 5 mg daily and we will be rechecking her lipid panel in 3 to 4 months time..     Diet and Physical Activity:  - Diet/Nutrition: portion control, limited junk food and low carb diet.  - Exercise: no formal exercise.    Depression Screening:    - PHQ-9 Score: 5    General Health:  - Sleep: unrefreshing sleep, daytime hypersomnolence, witnessed apnea and > 8 hours of sleep on average.  - Hearing: decreased hearing right ear and decreased hearing left ear.  - Vision: wears glasses and contacts and most recent eye exam < 1 year ago.  - Dental: regular dental visits, brushes teeth twice daily and floss regularly.    /GYN Health:  - Follows with GYN: yes.   - Menopause: postmenopausal.   - History of STDs: no    Advanced Care Planning:  - Has an advanced directive?: no    - Has a durable medical POA?: no    - ACP document given to patient?: yes      Review of Systems   Constitutional:  Negative for appetite change, chills and fever.   HENT:  Negative for ear pain, facial swelling, rhinorrhea, sinus pain, sore throat and trouble swallowing.    Eyes:  Negative for discharge and redness.   Respiratory:  Negative for chest tightness, shortness of breath and wheezing.    Cardiovascular:  Negative for chest pain and palpitations.   Gastrointestinal:  Negative for abdominal pain, diarrhea, nausea and vomiting.   Endocrine: Negative for polyuria.   Genitourinary:  Negative for dysuria and urgency.   Musculoskeletal:  Negative for arthralgias and back pain.   Skin:  Negative for rash.   Neurological:  Negative for dizziness, weakness and headaches.    Hematological:  Negative for adenopathy.   Psychiatric/Behavioral:  Positive for dysphoric mood. Negative for agitation, behavioral problems, confusion, decreased concentration, hallucinations, self-injury, sleep disturbance and suicidal ideas. The patient is not nervous/anxious and is not hyperactive.    All other systems reviewed and are negative.    Pertinent Medical History   Depression, chronic cough and hypercholesterolemia.      Medical History Reviewed by provider this encounter:     .  Past Medical History   Past Medical History:   Diagnosis Date    Acute gastritis     Asthma     Chronic cough     Depression     Disease of thyroid gland     hypo    Esophageal reflux     Fatigue     High cholesterol     Hypothyroidism     Otitis media     Pharyngitis     PMR (polymyalgia rheumatica) (HCC) 2024    Polymyalgia rheumatica (HCC)     URI (upper respiratory infection)      Past Surgical History:   Procedure Laterality Date     SECTION      CHOLECYSTECTOMY      EYE SURGERY      MN COLONOSCOPY FLX DX W/COLLJ SPEC WHEN PFRMD N/A 2016    Procedure: EGD AND COLONOSCOPY;  Surgeon: Luis Morrow MD;  Location: [facility];  Service: Gastroenterology     Family History   Problem Relation Age of Onset    Osteoarthritis Mother     Hypertension Father         essential    Hyperlipidemia Father     Hearing loss Father     Vision loss Father     Hypertension Maternal Grandmother         essential    Hyperlipidemia Maternal Grandmother     Hearing loss Maternal Grandmother     Vision loss Maternal Grandmother     Osteoarthritis Maternal Grandmother     Hypertension Maternal Grandfather         essential    Hyperlipidemia Maternal Grandfather     Osteoarthritis Maternal Grandfather     Diabetes Paternal Grandmother         mellitus    Hypertension Paternal Grandmother         essential    Stroke Paternal Grandmother         syndrome    Stroke Paternal Grandfather     Alcohol abuse Brother     Substance  Abuse Brother     Mental illness Brother     COPD Brother     No Known Problems Son     No Known Problems Son     Colon cancer Family     No Known Problems Maternal Aunt     No Known Problems Paternal Aunt       reports that she has never smoked. She has been exposed to tobacco smoke. She has never used smokeless tobacco. She reports that she does not currently use alcohol after a past usage of about 1.0 standard drink of alcohol per week. She reports that she does not use drugs.  Current Outpatient Medications   Medication Instructions    albuterol (PROVENTIL HFA,VENTOLIN HFA) 90 mcg/act inhaler INHALE 2 PUFFS EVERY 6 HOURS AS NEEDED FOR WHEEZING    ALPRAZolam (XANAX) 0.25 mg, Oral, 3 times daily PRN    azelastine (ASTELIN) 0.1 % nasal spray 1 spray, Nasal, 2 times daily, Use in each nostril as directed    escitalopram (LEXAPRO) 15 mg, Oral, Daily    estradiol (ESTRACE) 0.1 mg/g vaginal cream INSERT 1 GRAM VAGINALLY 3 TIMES  WEEKLY    levothyroxine 112 mcg, Oral, Daily    pantoprazole (PROTONIX) 40 mg, Daily    rosuvastatin (CRESTOR) 5 mg, Oral, Daily   Allergies[1]   Medications Ordered Prior to Encounter[2]   Social History     Tobacco Use    Smoking status: Never     Passive exposure: Past    Smokeless tobacco: Never   Vaping Use    Vaping status: Never Used   Substance and Sexual Activity    Alcohol use: Not Currently     Alcohol/week: 1.0 standard drink of alcohol     Types: 1 Standard drinks or equivalent per week    Drug use: Never    Sexual activity: Yes     Partners: Male     Birth control/protection: Post-menopausal       Objective   LMP  (LMP Unknown)     Physical Exam  Vitals and nursing note reviewed.   Constitutional:       General: She is not in acute distress.     Appearance: Normal appearance. She is well-developed. She is not ill-appearing or diaphoretic.   HENT:      Head: Normocephalic and atraumatic.      Right Ear: Tympanic membrane, ear canal and external ear normal.      Left Ear: Tympanic  membrane, ear canal and external ear normal.      Nose: Nose normal. No congestion or rhinorrhea.      Mouth/Throat:      Mouth: Mucous membranes are moist.      Pharynx: Oropharynx is clear. No oropharyngeal exudate or posterior oropharyngeal erythema.     Eyes:      General: No scleral icterus.        Right eye: No discharge.         Left eye: No discharge.      Extraocular Movements: Extraocular movements intact.      Conjunctiva/sclera: Conjunctivae normal.      Pupils: Pupils are equal, round, and reactive to light.     Neck:      Thyroid: No thyromegaly.      Vascular: No carotid bruit or JVD.      Trachea: No tracheal deviation.     Cardiovascular:      Rate and Rhythm: Normal rate and regular rhythm.      Pulses: Normal pulses.      Heart sounds: Normal heart sounds. No murmur heard.  Pulmonary:      Effort: Pulmonary effort is normal. No respiratory distress.      Breath sounds: Normal breath sounds. No stridor. No wheezing, rhonchi or rales.   Abdominal:      General: Abdomen is flat. Bowel sounds are normal. There is no distension.      Palpations: Abdomen is soft. There is no mass.      Tenderness: There is no abdominal tenderness. There is no guarding or rebound.     Musculoskeletal:         General: No swelling, tenderness or deformity. Normal range of motion.      Cervical back: Normal range of motion and neck supple. No rigidity.      Right lower leg: No edema.      Left lower leg: No edema.   Lymphadenopathy:      Cervical: No cervical adenopathy.     Skin:     General: Skin is warm and dry.      Capillary Refill: Capillary refill takes less than 2 seconds.      Coloration: Skin is not jaundiced.      Findings: No bruising, erythema or rash.     Neurological:      General: No focal deficit present.      Mental Status: She is alert and oriented to person, place, and time.      Cranial Nerves: No cranial nerve deficit.      Sensory: No sensory deficit.      Motor: No abnormal muscle tone.       Coordination: Coordination normal.      Gait: Gait normal.      Deep Tendon Reflexes: Reflexes are normal and symmetric. Reflexes normal.     Psychiatric:         Behavior: Behavior normal.         Thought Content: Thought content normal.         Judgment: Judgment normal.      Comments: Positive slight dysphoria and anhedonia but no suicidality.       Administrative Statements   I have spent a total time of 20 minutes in caring for this patient on the day of the visit/encounter including Diagnostic results, Prognosis, Risks and benefits of tx options, Instructions for management, and Patient and family education.       [1]   Allergies  Allergen Reactions    Aspirin     Celebrex [Celecoxib] Angioedema   [2]   Current Outpatient Medications on File Prior to Visit   Medication Sig Dispense Refill    albuterol (PROVENTIL HFA,VENTOLIN HFA) 90 mcg/act inhaler INHALE 2 PUFFS EVERY 6 HOURS AS NEEDED FOR WHEEZING 18 g 0    ALPRAZolam (XANAX) 0.25 mg tablet Take 1 tablet (0.25 mg total) by mouth 3 (three) times a day as needed for anxiety 60 tablet 0    azelastine (ASTELIN) 0.1 % nasal spray 1 spray into each nostril 2 (two) times a day Use in each nostril as directed 1 mL 2    escitalopram (LEXAPRO) 10 mg tablet TAKE 1 AND 1/2 TABLETS BY MOUTH  DAILY 135 tablet 3    estradiol (ESTRACE) 0.1 mg/g vaginal cream INSERT 1 GRAM VAGINALLY 3 TIMES  WEEKLY 42.5 g 3    levothyroxine 112 mcg tablet TAKE 1 TABLET BY MOUTH DAILY 90 tablet 3    pantoprazole (PROTONIX) 40 mg Take 40 mg by mouth in the morning       No current facility-administered medications on file prior to visit.

## 2025-05-18 NOTE — PROGRESS NOTES
[facility]   [facility] [place] [id number] [place] [address] [place] [address]  p: [phone] p: [phone]  f: [phone] f: [phone]         Cardiology Consultation Visit    [place]  Patient Identification:  Palma RAHMAN [facility]  1965     [id number] Care Team:  Raiza Campos DO (PCP)  Aarti Baez* (Referring Provider)         ASSESSMENT & PLAN     Assessment & Plan  Encounter for screening for coronary artery disease  Patient denies any anginal complaints, However notable traditional risk factors of CAD along with PMR/CRP Elevation,  having just started statin, Cont current med mgmt, Risk start with repeat FLP  Lpa and A1c, ETT/CCS for initial coronary evaluation, TTE to assess cardiac structure and function, Cont risk factor modification,  on diet/lifestyle modification, Monitor closely for symptomatic changes, Education on indication for ED assessment  Orders:  •  POCT ECG  •  CT coronary calcium score; Future    Hyperlipidemia, unspecified hyperlipidemia type   on 05/14/25, Started on rosuvastatin 5 mg on 05/15/25, Repeat FLP and get A1c/LP(a) in 3 months, Cont current med mgmt for now,  on diet/lifestyle modification  Orders:  •  Lipid Panel with Direct LDL reflex; Future  •  Lipoprotein A (LPA); Future  •  Hemoglobin A1C; Future    Family history of ASCVD  Plan as above       PMR (polymyalgia rheumatica) (HCC)  Given her traditional risk factors for ASCVD (fam hx, hld, obesity) and her chronic inflammatory disorder, will proceed with CCS at this time       Elevated C-reactive protein (CRP)  Plan as above       Aspirin allergy  Discussed desensitization options, Will hold pending imaging findings       LV (obstructive sleep apnea)  Sleep study on 04/29/25 with Severe LV, ABDULAZIZ 39.8, CPAP prescribed, Cont ongoing plan of care       Obesity  (BMI 30-39.9)  Weight over the last year has been fairly stable ± 5 lbs,  on diet/lifestyle modification, Referral to [facility]  Orders:  •  Ambulatory Referral to Weight Management; Future    Family history of bicuspid aortic valve  Mother with BAV/TAVR at age 75, Proceed with TTE at this time.  Orders:  •  Echo complete w/ contrast if indicated; Future    Discussion & Summary: Mrs. Palma Green  was seen and examined today to establish care. Our primary focus at this time is continued patient education, chronic condition management, and preventative care. Precautions and reasons to call our office or proceed to ER were discussed in detail. Patient expressed understanding and questions were answered.     Follow Up & Next Steps: Plan on follow up in-clinic in 4 months.  Will schedule sooner follow up if clinically indicated.      SUBJECTIVE   Subjective     Chief Complaint: Mrs. Green is a 60 y.o. female and never smoker with a PMH significant for but not limited to HLD, PMR, LV, Hypothyroidism, GERD and Obesity.  She presents to clinic by PCP referral for ASCVD Assessment.    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  Given her history of HLD and Heart Disease in her family she was referred to cardiology.  However, due to stressors at work she was only able to see us now.  She denies any recent hospitalizations, prior cardiac history, family history of early cad, or family history of scd.  She notes CAD in her Father with CABG in his late 50s and Nedra thereafter. Her mother had a TAVR with mild nonobstructive CAD.  She currently denies any cp, diaphoresis, n/v, sob, coughlin, palpitations, near syncope, syncope, orthopnea, pnd, or ble edema. She notes inconsistent control of her diet and exercise habits. She reports a diet that is  somewhat balanced but has room for improvement, water intake of 100 oz/day, caffeine intake of 1c/day of tea, rare alcohol intake, and exercise that is inconsistent but with plans for plans for improvement. She is otherwise compliant with medications but does not maintain a detailed BP log.  Of note, the patient's cardiac risk factor(s) include: dyslipidemia, obesity, sedentary lifestyle, and chronic inflammatory conditions.    Review of Systems: This was a comprehensive review of symptoms with problem focused details as note above.  Review of Systems   Constitutional: Negative for chills and fever.   HENT:  Negative for congestion and sore throat.    Eyes:  Negative for blurred vision and double vision.   Cardiovascular:  Negative for chest pain, claudication, dyspnea on exertion, irregular heartbeat, leg swelling, near-syncope, orthopnea (sleeps with a wedge due to gerd but can lie flat), palpitations, paroxysmal nocturnal dyspnea and syncope.   Respiratory:  Positive for snoring (recent sleep apnea study with severe apollo, pending cpap trial). Negative for cough, shortness of breath and wheezing.    Hematologic/Lymphatic: Negative for bleeding problem. Does not bruise/bleed easily.   Skin:  Negative for itching and rash.   Musculoskeletal:  Negative for joint pain, joint swelling and myalgias (improved overall).   Gastrointestinal:  Positive for heartburn (improved  with ppi). Negative for abdominal pain, constipation, diarrhea, nausea and vomiting.   Genitourinary:  Negative for dysuria and hematuria.   Neurological:  Negative for numbness and paresthesias.   Psychiatric/Behavioral:  Negative for depression. The patient is not nervous/anxious.         Past Hx: The following portions of the patient's history were reviewed and updated as appropriate: past medical history, past social history, past family history, past surgical history, current medications, allergies, past surgical history and problem list.  Social  History     Socioeconomic History   • Marital status: /Civil Union     Spouse name: Not on file   • Number of children: Not on file   • Years of education: Not on file   • Highest education level: Not on file   Occupational History   • Not on file   Tobacco Use   • Smoking status: Never     Passive exposure: Past   • Smokeless tobacco: Never   Vaping Use   • Vaping status: Never Used   Substance and Sexual Activity   • Alcohol use: Not Currently     Alcohol/week: 1.0 standard drink of alcohol     Types: 1 Standard drinks or equivalent per week   • Drug use: Never   • Sexual activity: Yes     Partners: Male     Birth control/protection: Post-menopausal   Other Topics Concern   • Not on file   Social History Narrative    Daily cola/tea consumption     Social Drivers of Health     Financial Resource Strain: Not on file   Food Insecurity: No Food Insecurity (5/14/2025)    Nursing - Inadequate Food Risk Classification    • Worried About Running Out of Food in the Last Year: Never true    • Ran Out of Food in the Last Year: Never true    • Ran Out of Food in the Last Year: Not on file   Transportation Needs: No Transportation Needs (5/14/2025)    PRAPARE - Transportation    • Lack of Transportation (Medical): No    • Lack of Transportation (Non-Medical): No   Physical Activity: Not on file   Stress: Not on file   Social Connections: Not on file   Intimate Partner Violence: Not on file   Housing Stability: Low Risk  (5/14/2025)    Housing Stability Vital Sign    • Unable to Pay for Housing in the Last Year: No    • Number of Times Moved in the Last Year: 0    • Homeless in the Last Year: No        Family History   Problem Relation Age of Onset   • Osteoarthritis Mother    • Hypertension Father         essential   • Hyperlipidemia Father    • Hearing loss Father    • Vision loss Father    • Hypertension Maternal Grandmother         essential   • Hyperlipidemia Maternal Grandmother    • Hearing loss Maternal  Grandmother    • Vision loss Maternal Grandmother    • Osteoarthritis Maternal Grandmother    • Hypertension Maternal Grandfather         essential   • Hyperlipidemia Maternal Grandfather    • Osteoarthritis Maternal Grandfather    • Diabetes Paternal Grandmother         mellitus   • Hypertension Paternal Grandmother         essential   • Stroke Paternal Grandmother         syndrome   • Stroke Paternal Grandfather    • Alcohol abuse Brother    • Substance Abuse Brother    • Mental illness Brother    • COPD Brother    • No Known Problems Son    • No Known Problems Son    • Colon cancer Family    • No Known Problems Maternal Aunt    • No Known Problems Paternal Aunt         Problem List[1]     Past Surgical History:   Procedure Laterality Date   •  SECTION     • CHOLECYSTECTOMY     • EYE SURGERY     • HI COLONOSCOPY FLX DX W/COLLJ SPEC WHEN PFRMD N/A 2016    Procedure: EGD AND COLONOSCOPY;  Surgeon: Luis Morrow MD;  Location: [facility];  Service: Gastroenterology        Current Outpatient Medications   Medication Instructions   • albuterol (PROVENTIL HFA,VENTOLIN HFA) 90 mcg/act inhaler INHALE 2 PUFFS EVERY 6 HOURS AS NEEDED FOR WHEEZING   • ALPRAZolam (XANAX) 0.25 mg, Oral, 3 times daily PRN   • azelastine (ASTELIN) 0.1 % nasal spray 1 spray, Nasal, 2 times daily, Use in each nostril as directed   • escitalopram (LEXAPRO) 15 mg, Oral, Daily   • estradiol (ESTRACE) 0.1 mg/g vaginal cream INSERT 1 GRAM VAGINALLY 3 TIMES  WEEKLY   • levothyroxine 112 mcg, Oral, Daily   • pantoprazole (PROTONIX) 40 mg, Daily   • rosuvastatin (CRESTOR) 5 mg, Oral, Daily        Allergies   Allergen Reactions   • Aspirin    • Celebrex [Celecoxib] Angioedema         OBJECTIVE   Objective     Physical Exam: Patient seen and examined with no need for interpretive services.   VITALS   Vitals:    25 0834   BP: 118/76   Pulse: 90   SpO2: 95%     BMI   Body mass index is 36.22 kg/m².  Physical Exam  Constitutional:        General: She is not in acute distress.     Appearance: She is obese. She is not toxic-appearing.      Interventions: She is not intubated.  HENT:      Head: Normocephalic and atraumatic.      Right Ear: External ear normal.      Left Ear: External ear normal.      Nose: Nose normal.     Eyes:      General: No scleral icterus.        Right eye: No discharge.         Left eye: No discharge.     Neck:      Vascular: No carotid bruit.     Cardiovascular:      Rate and Rhythm: Normal rate and regular rhythm.      Pulses: Normal pulses.      Heart sounds: Normal heart sounds.   Pulmonary:      Effort: Pulmonary effort is normal. No tachypnea, accessory muscle usage or respiratory distress. She is not intubated.      Breath sounds: Normal breath sounds. No wheezing or rales.     Musculoskeletal:      Cervical back: Neck supple.      Right lower leg: No edema.      Left lower leg: No edema.     Skin:     General: Skin is warm and dry.      Capillary Refill: Capillary refill takes less than 2 seconds.     Neurological:      Mental Status: She is alert and oriented to person, place, and time. Mental status is at baseline.     Psychiatric:         Mood and Affect: Mood normal.         Behavior: Behavior normal.          Recent CV Testin25  EKG  NSR    For historical clinical findings, see the Supplemental Documentation section.     CLOSING   Administrative Statements     Coordination of Care: During our visit, I spent 40 minutes with the patient, and greater than 60% of this time was spent on counseling and coordination of care, including addressing diagnostic results, prognosis, risks and benefits of treatment options, instructions for management, patient/family education, importance of treatment compliance, along with risk factor reductions. During today's visit, the patient was unaccompanied, and there was no need for interpretive services.     Dictation Disclaimer: This note was completed in part utilizing  direct voice recognition software. Grammatical errors, random word insertion, spelling mistakes, and incomplete sentences may be an occasional consequence of the system secondary to software limitations, ambient noise and hardware issues. At the time of dictation, efforts were made to edit, clarify and /or correct errors.  Please read the chart carefully and recognize, using context, where substitutions have occurred.  If you have any questions or concerns about the context, text or information contained within the body of this dictation, please contact me, the provider, for further clarification.     Katie Faith, DO 25                      SUPPLEMENTAL DOCUMENTATION     Thoracic History   ======================  PRIOR VISIT INTERVALS  ======================  Date: 25, Initial Consultation      =====================  PRIOR DIAGNOSTIC TESTS  =====================  IMAGING   I have personally reviewed pertinent reports.  Procedure: Home Study  Result Date: 2025  Narrative: Table formatting from the original result was not included. Images from the original result were not included. Home Study Report Patient Name: Palma Green Patient Number: [id number] Date of Home Study: 2025 Referring Physician: Raiza Campos Interpreting Physician:  John MATTHEW: 1965 STUDY FORMAT: The patient was admitted to the sleep laboratory at the [facility] and oriented to the home sleep study testing procedure and equipment.  The home sleep testing study was performed using the [employer] Night One device.  A return demonstration by the patient helped to assure correct usage.  The following parameters were monitored: p-flow via nasal cannula, body position, oximetry, pulse rate and respiratory effort via thoracic belt.  The sleep study was scored following the rules established by the American Academy of Sleep Medicine (AASM).  Hypopneas are defined as a  >=30% drop in flow for >=10 seconds that are associated with >=4% desaturations.  ABDULAZIZ is the Respiratory Event Index (number of respiratory events per hour) and is a surrogate for the apnea/hypopnea index (AHI). PATIENT HISTORY: 60-year-old female with a past medical history of asthma, hypothyroidism, polymyalgia rheumatica who presents for evaluation of suspected sleep apnea.  She reports excessive daytime sleepiness, snoring, choking/gasping during sleep. TESTING RESULTS: The test results are from [facility].  The total time in bed (analysis time) was 450.6 minutes.  The patient had a total of 299 respiratory events made up of 194 obstructive apneas, 14 central apneas, 0 mixed apneas and 91 hypopneas resulting in a respiratory event index (ABDULAZIZ) of 39.8.  The lowest SpO2 recorded is 77%.     Impression: 1.  Severe obstructive sleep apnea with an ABDULAZIZ of 39.8 events per hour. 2.  Baseline oxygenation adequate.  Respiratory event related hypoxemia with oxygen saturation less than 90% for 55.2 minutes. Oxygen meagan of 77%.  RECOMMENDATION: Recommend auto CPAP with pressure range of 5-20 cm H20. Compliance check 1-2 months after starting CPAP. Recommend consultation with sleep medicine. John Ferreira MD [facility] Board Certified Sleep Physician  Study Date: 2025 Patient Name: Palma Green Recording Device: Borderfree Sex: F Height: 64.0 in. : 1965 Weight: 217.0 lbs. Age: 60 years B.M.I: 37.2 lb/in2 Times and Durations Lights off clock time:  9:59:38 PM Total Recording Time (TRT): 451.1 minutes Lights on clock time: 5:30:14 AM Time In Bed (TIB): 450.6 minutes   Monitoring Time (MT): 450.6 minutes Device and Sensor Details The study was recorded on a [employer] device using 1 RIP effort belt and a pressure based flow sensor.The heart rate is derived from the oximeter sensor and the snore signal is derived from the pressure sensor. The device also records body  position and uses it to determine the monitoring time (sleep/wake periods). Summary ABDULAZIZ 39.8 OAI 25.8 NIKITA 1.9 Lowest Desat 77 ABDULAZIZ is the number of respiratory events per hour. OAI is the number of obstructive apneas per hour. NIKITA is the number of central apneas per hour. Lowest Desat is the lowest blood oxygen level that lasted at least 2 seconds. RESPIRATORY EVENTS  Index (#/hour) Total # of Events Mean duration  (sec) Max duration  (sec) # of Events by Position      Supine Prone Left Right Up Central Apneas 1.9 14 16.8 25.5 11    2 1 0 Obstructive Apneas 25.8 194 17.1 39.0 100    48 46 0 Mixed Apneas 0.0 0 0.0 0.0 0    0 0 0 Hypopneas 12.1 91 23.1 54.5 40    29 21 1 Apneas + Hypopneas 39.8 299 18.9 54.5 151    79 68 1 RERAs 0.0 0 0.0 0.0 0    0 0 0 Total 39.8 299 18.9 54.5 151    79 68 1 Time in Position 119.4    220.4 110.2 0.6 ABDULAZIZ in Position 75.9    21.5 37.0 100.0 Positional Summary  Supine Non-Supine Total # of Events 151 148.00 Total Duration (minutes) 119.4 331.20 Sleep Duration (minutes) 119.4 331.20 ABDULAZIZ in Position 75.9 26.81 REISupine / REINon-Supine Ratio 2.83  Positional Sleep Apnea is indicated if ABDULAZIZ (supine) >= 2 x ABDULAZIZ (non-supine) per [place], Sleep. 1984;7(2).  Oximetry Summary  Dur. (min) % TIB <90 % 55.2 12.3 <85 % 7.7 1.7 <80 % 0.1 0.0 <70 % 0.0 0.0 Total Dur (min) < 89 35.9 min Average (%) 92 Total # of Desats 267 Desat Index (#/hour) 35.7 Desat Max (%) 14 Desat Max dur (sec) 70.0 Lowest SpO2 % during sleep 77 Duration of Min SpO2 (sec) 3 Highest SpO2 % during sleep 98 Duration of Max SpO2(sec) 27  Heart Rate Stats Mean HR during sleep 75.1 (BPM) Highest HR during sleep 97  (BPM) Highest HR during TIB  97 (BPM) Lowest HR during sleep 67  (BPM) Lowest HR during TIB 67 (BPM) Snoring Summary Total Snoring Episodes 600 Total Duration with Snoring 292.1 minutes Mean Duration of Snoring 29.2 seconds Percentage of Snoring 64.8 %      Procedure: DXA bone density spine hip and pelvis  Result Date:  4/25/2025  Narrative: DXA SCAN CLINICAL HISTORY: 60-year-old postmenopausal female. OTHER RISK FACTORS: Prior fracture as a result of minor injury, PPI therapy, SSRI therapy. PHARMACOLOGIC THERAPY FOR OSTEOPOROSIS:  None. TECHNIQUE: Bone densitometry was performed using a   [employer] Horizon A bone densitometer.  Regions of interest appear properly placed. COMPARISON: There are no prior DXA studies performed on this unit for comparison. RESULTS: LUMBAR SPINE Level: L1-L4 : BMD: 0.951 gm/cm2 T-score: -0.9 LEFT  TOTAL HIP: BMD: 0.945 gm/cm2 T-score: 0.0 LEFT  FEMORAL NECK: BMD: 0.732 gm/cm2 T score: -1.1     Impression: 1. Low bone mass (osteopenia). Based on the left femoral neck 2.  The 10 year risk of hip fracture is 0.6% with the 10 year risk of major osteoporotic fracture being 11% as calculated by the [employer] fracture risk assessment tool (FRAX, which is based on data generated by the WHO Collaborating [place]  for Metabolic Bone Diseases). 3.  The current Bone Health and Osteoporosis Foundation (BHOF) guidelines recommend treating patients with a T-score of -2.5 or less in the lumbar spine or hips, or in post-menopausal women and men over the age of 50 with low bone mass (osteopenia; T-score between -1.0 and -2.5) and a FRAX 10 year risk score of > 3% for hip fracture and/or > 20% for major osteoporosis-related fracture (clinical vertebral, hip, forearm, or proximal humerus). 4.  The BHOF recommends follow-up DXA in 1-2 years after initiating or changing medical therapy for osteoporosis and at appropriate intervals thereafter, according to clinical circumstances. More frequent BMD testing may be warranted in higher-risk individuals (multiple fractures, older age, very low BMD). Less frequent BMD testing is warranted as follow-up in patients with initial T-scores in the normal or slightly below normal range (osteopenia) and for patients who have remained fracture free on  treatment. The FRAX  algorithm has certain limitations: -FRAX has not been validated in patients currently or previously treated with pharmacotherapy for osteoporosis.  In such patients, clinical judgment must be exercised in interpreting FRAX scores. -Prior hip, vertebral and humeral fragility fractures appear to confer greater risk of subsequent fracture than fractures at other sites (this is especially true for individuals with severe vertebral fractures), but quantification of this incremental risk is not possible with FRAX. -FRAX underestimates fracture risk in patients with history of multiple fragility fractures. -FRAX may underestimate fracture risk in patients with history of frequent falls. -It is not appropriate to use FRAX to monitor treatment response. WHO CLASSIFICATION: Normal (a T-score of -1.0 or higher) Low bone mineral density (a T-score of less than -1.0 but higher than -2.5) Osteoporosis (a T-score of -2.5 or less) Severe osteoporosis (a T-score of -2.5 or less with a fragility fracture) SELECTED REFERENCES: Alvaro MS, Claudia SL, Eliot KL, et al. The clinician's guide to prevention and treatment of osteoporosis. Osteoporos Int 2022; 33:7134-5680. Romelia D, Bryn SB, Crow A, et al. DXA reporting updates: 2023 Official Positions of the International Society for Clinical Densitometry. J Clin Densitom 2024; 27: 209426 (https://doi.org/10.1016/j.jocd.2023.999841). Workstation performed: [id number]     Procedure: Mammo screening bilateral w 3d and cad  Result Date: 4/6/2025  Narrative: DIAGNOSIS: Encounter for screening mammogram for breast cancer TECHNIQUE: Digital screening mammography was performed. Computer Aided Detection (CAD) analyzed all applicable images. COMPARISONS: Prior breast imaging dated: 12/06/2022, 12/06/2022, 12/06/2021, 10/29/2020, 10/29/2020, 10/23/2019, 10/23/2019, 05/08/2017, 05/06/2016, and 04/21/2015 RELEVANT HISTORY: Family Breast Cancer History: No known family history of breast cancer.  Family Medical History: Family medical history includes colon cancer in family. Personal History: Hormone history includes birth control and other. No known relevant surgical history. No known relevant medical history. The patient is scheduled in a reminder system for screening mammography. 8-10% of cancers will be missed on mammography. Management of a palpable abnormality must be based on clinical grounds.  Patients will be notified of their results via letter from our facility. Accredited by American College of Radiology and FDA. RISK ASSESSMENT: 5 Year Tyrer-Cuzick: 1.21% 10 Year Tyrer-Cuzick: 2.61% Lifetime Tyrer-Cuzick: 6.82% TISSUE DENSITY: The breasts are almost entirely fatty. INDICATION: Palma Grene is a 60 y.o. female presenting for screening mammography. FINDINGS: Bilateral There are no suspicious masses, grouped microcalcifications or areas of unexplained architectural distortion.  Stable calcifications are seen in the outer left breast.  The skin and nipple areolar complex are unremarkable.      Impression: No mammographic evidence of malignancy. No significant change ASSESSMENT/BI-RADS CATEGORY: Left: 2 - Benign Right: 1 - Negative Overall: 2 - Benign RECOMMENDATION:      - Routine screening mammogram in 1 year for both breasts. Workstation ID: [id number] Signed by:  Susie Browne MD       EKG   Date: 05/21/25, normal sinus rhythm  Date: 12/18/15, normal sinus rhythm, low voltage in precordial leads, nonspecific T wave findings  Date: 08/12/13, normal sinus rhythm, low voltage in precordial leads      TELE   No results found for this or any recent visit.      HOLTER   No results found for this or any previous visit.      DEVICE   No results found for this or any previous visit.      EP   No results found for this or any previous visit.      CT   No results found for this or any previous visit.      CATH   No results found for this or any previous visit.      STRESS   No results found for this or  "any previous visit.       TTE   No results found for this or any previous visit.      CARMINA   No results found for this or any previous visit.      CMR   No results found for this or any previous visit.      LABS     Lab Results   Component Value Date     12/18/2015    K 4.9 05/14/2025    K 4.8 04/22/2024    K 4.3 04/23/2022     05/14/2025     04/22/2024     04/23/2022    CO2 28 05/14/2025    CO2 31 04/22/2024    CO2 29 04/23/2022    BUN 15 05/14/2025    BUN 15 04/22/2024    BUN 19 04/23/2022    CREATININE 0.78 05/14/2025    CREATININE 0.86 04/22/2024    CREATININE 0.80 04/23/2022    CREATININE 0.84 12/18/2015    EGFR 87 05/14/2025    EGFR 78 04/22/2024    GLUC 90 05/14/2025    GLUC 94 04/22/2024    GLUC 95 04/23/2022    AST 17 05/14/2025    AST 18 04/22/2024    AST 18 04/23/2022    ALT 20 05/14/2025    ALT 15 04/22/2024    ALT 18 04/23/2022    TBILI 0.3 05/14/2025    TBILI 0.5 04/22/2024    TBILI 0.4 04/23/2022    ALB 4.1 05/14/2025    ALB 4.1 04/22/2024    ALB 4.1 04/23/2022    ALB 3.5 12/18/2015    MG 1.8 12/18/2015    CALCIUM 9.7 05/14/2025    CALCIUM 9.7 04/22/2024    CALCIUM 9.8 04/23/2022      Lab Results   Component Value Date    WBC 6.3 05/14/2025    WBC 5.8 04/23/2022    WBC 7.51 12/18/2015    HGB 14.2 05/14/2025    HGB 14.2 04/23/2022    HGB 13.2 12/18/2015    HCT 42.2 05/14/2025    HCT 42.7 04/23/2022    HCT 39.8 12/18/2015     05/14/2025     04/23/2022     12/18/2015    INR 0.99 12/18/2015      Lab Results   Component Value Date    CHOLESTEROL 234 (H) 05/14/2025    CHOLESTEROL 236 (H) 04/22/2024    CHOLESTEROL 241 (H) 04/23/2022    TRIG 213 (H) 05/14/2025    TRIG 143 04/22/2024    TRIG 170 (H) 04/23/2022    HDL 50 05/14/2025    HDL 51 04/22/2024    HDL 52 04/23/2022    LDLCALC 149 (H) 05/14/2025    LDLCALC 158 (H) 04/22/2024    LDLCALC 158 (H) 04/23/2022     No results found for: \"HGBA1C\", \"GLUF\", \"POCGLU\"  Lab Results   Component Value Date    TSH 1.40 " "04/22/2024    TSH 3.63 05/08/2018    FT3 3.0 04/23/2022     No results found for: \"HSTNI0\", \"HSTNI2\", \"HSTNI4\", \"TROPONINI\"  No results found for: \"BNP\", \"NTBNP\"              [1]  Patient Active Problem List  Diagnosis   • Acid reflux disease   • Foreign body in respiratory tree   • Anxiety   • Asthma   • Cyst   • Degeneration of posterior vitreous body of left eye   • Depression   • Hypercholesteremia   • Hypothyroidism   • Lattice degeneration of left retina   • Left retinal detachment   • Obesity, Class II, BMI 35-39.9   • Soft tissue lesion   • LV (obstructive sleep apnea)   "

## 2025-05-18 NOTE — ASSESSMENT & PLAN NOTE
Given her traditional risk factors for ASCVD (fam hx, hld, obesity) and her chronic inflammatory disorder, will proceed with CCS at this time

## 2025-05-18 NOTE — ASSESSMENT & PLAN NOTE
Sleep study on 04/29/25 with Severe LV, ABDULAZIZ 39.8, CPAP prescribed, Cont ongoing plan of care

## 2025-05-21 ENCOUNTER — OFFICE VISIT (OUTPATIENT)
Dept: CARDIOLOGY CLINIC | Facility: MEDICAL CENTER | Age: 60
End: 2025-05-21
Payer: COMMERCIAL

## 2025-05-21 VITALS
SYSTOLIC BLOOD PRESSURE: 118 MMHG | DIASTOLIC BLOOD PRESSURE: 76 MMHG | HEART RATE: 90 BPM | HEIGHT: 64 IN | OXYGEN SATURATION: 95 % | WEIGHT: 211 LBS | BODY MASS INDEX: 36.02 KG/M2

## 2025-05-21 DIAGNOSIS — E78.5 HYPERLIPIDEMIA, UNSPECIFIED HYPERLIPIDEMIA TYPE: ICD-10-CM

## 2025-05-21 DIAGNOSIS — E66.9 OBESITY (BMI 30-39.9): ICD-10-CM

## 2025-05-21 DIAGNOSIS — G47.33 OSA (OBSTRUCTIVE SLEEP APNEA): ICD-10-CM

## 2025-05-21 DIAGNOSIS — Z88.8 ASPIRIN ALLERGY: ICD-10-CM

## 2025-05-21 DIAGNOSIS — Z13.6 ENCOUNTER FOR SCREENING FOR CORONARY ARTERY DISEASE: Primary | ICD-10-CM

## 2025-05-21 DIAGNOSIS — Z82.49 FAMILY HISTORY OF ASCVD: ICD-10-CM

## 2025-05-21 DIAGNOSIS — M35.3 PMR (POLYMYALGIA RHEUMATICA) (HCC): ICD-10-CM

## 2025-05-21 DIAGNOSIS — R79.82 ELEVATED C-REACTIVE PROTEIN (CRP): ICD-10-CM

## 2025-05-21 DIAGNOSIS — Z82.79 FAMILY HISTORY OF BICUSPID AORTIC VALVE: ICD-10-CM

## 2025-05-21 PROCEDURE — 99204 OFFICE O/P NEW MOD 45 MIN: CPT | Performed by: INTERNAL MEDICINE

## 2025-05-21 NOTE — ASSESSMENT & PLAN NOTE
Depression Screening Follow-up Plan: Patient's depression screening was positive with a PHQ-9 score of 5. Patient with underlying depression and was advised to continue current medications as prescribed.  Lexapro was increased from 10 to 15 mg and patient to call in 2 weeks time with an update.

## 2025-06-08 ENCOUNTER — HOSPITAL ENCOUNTER (OUTPATIENT)
Dept: CT IMAGING | Facility: HOSPITAL | Age: 60
Discharge: HOME | End: 2025-06-08
Attending: INTERNAL MEDICINE
Payer: COMMERCIAL

## 2025-06-08 DIAGNOSIS — Z13.6 ENCOUNTER FOR SCREENING FOR CORONARY ARTERY DISEASE: ICD-10-CM

## 2025-06-08 PROCEDURE — 75571 CT HRT W/O DYE W/CA TEST: CPT

## 2025-06-10 DIAGNOSIS — E78.00 HYPERCHOLESTEROLEMIA: ICD-10-CM

## 2025-06-10 RX ORDER — ROSUVASTATIN CALCIUM 5 MG/1
5 TABLET, COATED ORAL DAILY
Qty: 90 TABLET | Refills: 1 | Status: SHIPPED | OUTPATIENT
Start: 2025-06-10 | End: 2025-07-10

## 2025-07-09 DIAGNOSIS — K21.00 GASTROESOPHAGEAL REFLUX DISEASE WITH ESOPHAGITIS WITHOUT HEMORRHAGE: Primary | ICD-10-CM

## 2025-07-09 RX ORDER — PANTOPRAZOLE SODIUM 20 MG/1
20 TABLET, DELAYED RELEASE ORAL
Qty: 90 TABLET | Refills: 3 | Status: SHIPPED | OUTPATIENT
Start: 2025-07-09 | End: 2026-07-04

## 2025-08-01 ENCOUNTER — HOSPITAL ENCOUNTER (OUTPATIENT)
Dept: NON INVASIVE DIAGNOSTICS | Facility: CLINIC | Age: 60
Discharge: HOME | End: 2025-08-01
Payer: COMMERCIAL

## 2025-08-01 VITALS
WEIGHT: 211 LBS | HEIGHT: 64 IN | DIASTOLIC BLOOD PRESSURE: 76 MMHG | BODY MASS INDEX: 36.02 KG/M2 | SYSTOLIC BLOOD PRESSURE: 118 MMHG | HEART RATE: 90 BPM

## 2025-08-01 DIAGNOSIS — Z82.79 FAMILY HISTORY OF BICUSPID AORTIC VALVE: ICD-10-CM

## 2025-08-01 LAB
AORTIC ROOT: 3 CM
ASCENDING AORTA: 3.1 CM
BSA FOR ECHO PROCEDURE: 2 M2
E WAVE DECELERATION TIME: 185 MS
E/A RATIO: 0.91
FRACTIONAL SHORTENING: 31 (ref 28–44)
INTERVENTRICULAR SEPTUM IN DIASTOLE (PARASTERNAL SHORT AXIS VIEW): 1 CM
INTERVENTRICULAR SEPTUM: 1 CM (ref 0.6–1.1)
LAAS-AP2: 13.1 CM2
LAAS-AP4: 13.4 CM2
LEFT ATRIUM SIZE: 3.6 CM
LEFT ATRIUM VOLUME (MOD BIPLANE): 28 ML
LEFT ATRIUM VOLUME INDEX (MOD BIPLANE): 14 ML/M2
LEFT INTERNAL DIMENSION IN SYSTOLE: 2.7 CM (ref 2.1–4)
LEFT VENTRICULAR INTERNAL DIMENSION IN DIASTOLE: 3.9 CM (ref 3.5–6)
LEFT VENTRICULAR POSTERIOR WALL IN END DIASTOLE: 1.1 CM
LEFT VENTRICULAR STROKE VOLUME: 42 ML
LV EF US.2D.A4C+ESTIMATED: 77 %
LVSV (TEICH): 42 ML
MV E'TISSUE VEL-LAT: 10 CM/S
MV E'TISSUE VEL-SEP: 7 CM/S
MV PEAK A VEL: 0.74 M/S
MV PEAK E VEL: 67 CM/S
MV STENOSIS PRESSURE HALF TIME: 54 MS
MV VALVE AREA P 1/2 METHOD: 4.07
RA PRESSURE ESTIMATED: 5 MMHG
RIGHT ATRIUM AREA SYSTOLE A4C: 12 CM2
RIGHT VENTRICLE ID DIMENSION: 4.1 CM
RV PSP: 18 MMHG
SL CV LEFT ATRIUM LENGTH A2C: 5.1 CM
SL CV LV EF: 65
SL CV PED ECHO LEFT VENTRICLE DIASTOLIC VOLUME (MOD BIPLANE) 2D: 68 ML
SL CV PED ECHO LEFT VENTRICLE SYSTOLIC VOLUME (MOD BIPLANE) 2D: 26 ML
TR MAX PG: 13 MMHG
TR PEAK VELOCITY: 1.8 M/S
TRICUSPID ANNULAR PLANE SYSTOLIC EXCURSION: 2.2 CM
TRICUSPID VALVE PEAK REGURGITATION VELOCITY: 1.81 M/S

## 2025-08-01 PROCEDURE — 93306 TTE W/DOPPLER COMPLETE: CPT

## 2025-08-01 PROCEDURE — 93306 TTE W/DOPPLER COMPLETE: CPT | Performed by: INTERNAL MEDICINE
